# Patient Record
Sex: MALE | Race: WHITE | NOT HISPANIC OR LATINO | Employment: OTHER | URBAN - METROPOLITAN AREA
[De-identification: names, ages, dates, MRNs, and addresses within clinical notes are randomized per-mention and may not be internally consistent; named-entity substitution may affect disease eponyms.]

---

## 2021-02-08 PROBLEM — G47.33 OBSTRUCTIVE SLEEP APNEA: Status: ACTIVE | Noted: 2021-02-08

## 2021-02-09 ENCOUNTER — TRANSCRIBE ORDERS (OUTPATIENT)
Dept: SLEEP CENTER | Facility: CLINIC | Age: 61
End: 2021-02-09

## 2021-07-28 ENCOUNTER — OFFICE VISIT (OUTPATIENT)
Dept: URGENT CARE | Facility: MEDICAL CENTER | Age: 61
End: 2021-07-28
Payer: COMMERCIAL

## 2021-07-28 VITALS
DIASTOLIC BLOOD PRESSURE: 80 MMHG | RESPIRATION RATE: 16 BRPM | WEIGHT: 225 LBS | BODY MASS INDEX: 31.5 KG/M2 | TEMPERATURE: 99.6 F | OXYGEN SATURATION: 95 % | HEIGHT: 71 IN | HEART RATE: 90 BPM | SYSTOLIC BLOOD PRESSURE: 170 MMHG

## 2021-07-28 DIAGNOSIS — J40 BRONCHITIS: Primary | ICD-10-CM

## 2021-07-28 PROCEDURE — 99213 OFFICE O/P EST LOW 20 MIN: CPT | Performed by: PHYSICIAN ASSISTANT

## 2021-07-28 RX ORDER — BENZONATATE 100 MG/1
100 CAPSULE ORAL 3 TIMES DAILY PRN
Qty: 20 CAPSULE | Refills: 0 | Status: SHIPPED | OUTPATIENT
Start: 2021-07-28

## 2021-07-28 RX ORDER — ALBUTEROL SULFATE 90 UG/1
2 AEROSOL, METERED RESPIRATORY (INHALATION) EVERY 6 HOURS PRN
Qty: 8.5 G | Refills: 0 | Status: SHIPPED | OUTPATIENT
Start: 2021-07-28

## 2021-07-28 RX ORDER — ASPIRIN 81 MG/1
TABLET ORAL
COMMUNITY

## 2021-07-28 RX ORDER — FLUTICASONE PROPIONATE 50 MCG
SPRAY, SUSPENSION (ML) NASAL
COMMUNITY

## 2021-07-28 RX ORDER — AZITHROMYCIN 250 MG/1
TABLET, FILM COATED ORAL
Qty: 6 TABLET | Refills: 0 | Status: SHIPPED | OUTPATIENT
Start: 2021-07-28 | End: 2021-08-01

## 2021-07-28 RX ORDER — ATENOLOL 50 MG/1
TABLET ORAL
COMMUNITY

## 2021-07-28 NOTE — PATIENT INSTRUCTIONS
Acute Bronchitis   WHAT YOU NEED TO KNOW:   Acute bronchitis is swelling and irritation in your lungs  It is usually caused by a virus and most often happens in the winter  Bronchitis may also be caused by bacteria or by a chemical irritant, such as smoke  DISCHARGE INSTRUCTIONS:   Return to the emergency department if:   · You cough up blood  · Your lips or fingernails turn blue  · You feel like you are not getting enough air when you breathe  Call your doctor if:   · Your symptoms do not go away or get worse, even after treatment  · Your cough does not get better within 4 weeks  · You have questions or concerns about your condition or care  Medicines: You may  need any of the following:  · Cough suppressants  decrease your urge to cough  · Decongestants  help loosen mucus in your lungs and make it easier to cough up  This can help you breathe easier  · Inhalers  may be given  Your healthcare provider may give you one or more inhalers to help you breathe easier and cough less  An inhaler gives your medicine to open your airways  Ask your healthcare provider to show you how to use your inhaler correctly  · Acetaminophen  decreases pain and fever  It is available without a doctor's order  Ask how much to take and how often to take it  Follow directions  Read the labels of all other medicines you are using to see if they also contain acetaminophen, or ask your doctor or pharmacist  Acetaminophen can cause liver damage if not taken correctly  Do not use more than 4 grams (4,000 milligrams) total of acetaminophen in one day  · NSAIDs  help decrease swelling and pain or fever  This medicine is available with or without a doctor's order  NSAIDs can cause stomach bleeding or kidney problems in certain people  If you take blood thinner medicine, always ask your healthcare provider if NSAIDs are safe for you  Always read the medicine label and follow directions      · Take your medicine as directed  Contact your healthcare provider if you think your medicine is not helping or if you have side effects  Tell him of her if you are allergic to any medicine  Keep a list of the medicines, vitamins, and herbs you take  Include the amounts, and when and why you take them  Bring the list or the pill bottles to follow-up visits  Carry your medicine list with you in case of an emergency  Self-care:   · Drink liquids as directed  You may need to drink more liquids than usual to stay hydrated  Ask how much liquid to drink each day and which liquids are best for you  · Use a cool mist humidifier  to increase air moisture in your home  This may make it easier for you to breathe and help decrease your cough  · Get more rest   Rest helps your body to heal  Slowly start to do more each day  Rest when you feel it is needed  · Avoid irritants in the air  Avoid chemicals, fumes, and dust  Wear a face mask if you must work around dust or fumes  Stay inside on days when air pollution levels are high  If you have allergies, stay inside when pollen counts are high  Do not use aerosol products, such as spray-on deodorant, bug spray, and hair spray  · Do not smoke or be around others who are smoking  Nicotine and other chemicals in cigarettes and cigars can cause lung damage  Ask your healthcare provider for information if you currently smoke and need help to quit  E-cigarettes or smokeless tobacco still contain nicotine  Talk to your healthcare provider before you use these products  Prevent acute bronchitis:       · Get the vaccinations you need  Ask your healthcare provider if you should get the flu or pneumonia vaccines  · Prevent the spread of germs  You can decrease your risk for acute bronchitis and other illnesses by doing the following:     ? Wash your hands often with soap and water  Carry germ-killing hand lotion or gel with you   You can use the lotion or gel to clean your hands when soap and water are not available  ? Do not touch your eyes, nose, or mouth unless you have washed your hands first     ? Always cover your mouth when you cough to prevent the spread of germs  It is best to cough into a tissue or your shirt sleeve instead of into your hand  Ask those around you to cover their mouths when they cough  ? Try to avoid people who have a cold or the flu  If you are sick, stay away from others as much as possible  Follow up with your doctor as directed:  Write down questions you have so you will remember to ask them during your follow-up visits  © [email or web address] 2021 Information is for End User's use only and may not be sold, redistributed or otherwise used for commercial purposes  All illustrations and images included in CareNotes® are the copyrighted property of [employer]  or [employer]i Eisenberg  The above information is an  only  It is not intended as medical advice for individual conditions or treatments  Talk to your doctor, nurse or pharmacist before following any medical regimen to see if it is safe and effective for you

## 2021-07-28 NOTE — PROGRESS NOTES
[facility]        NAME: Camden Jimenez is a 57 y o  male  : 1960    MRN: [id number]  DATE: 2021  TIME: 2:55 PM    /80   Pulse 90   Temp 99 6 °F (37 6 °C)   Resp 16   Ht 5' 11" (1 803 m)   Wt 102 kg (225 lb)   SpO2 95%   BMI 31 38 kg/m²     Assessment and Plan   Bronchitis [J40]  1  Bronchitis  azithromycin (ZITHROMAX) 250 mg tablet    benzonatate (TESSALON PERLES) 100 mg capsule    albuterol (ProAir HFA) 90 mcg/act inhaler         Patient Instructions       Follow up with PCP in 3-5 days  Proceed to  ER if symptoms worsen  Chief Complaint     Chief Complaint   Patient presents with    Cough     Pt here for cough x 2 days  History of Present Illness       Pt with productive cough for about 1 week     Cough        Review of Systems   Review of Systems   Constitutional: Negative  HENT: Negative  Eyes: Negative  Respiratory: Positive for cough  Cardiovascular: Negative  Gastrointestinal: Negative  Endocrine: Negative  Genitourinary: Negative  Musculoskeletal: Negative  Skin: Negative  Allergic/Immunologic: Negative  Neurological: Negative  Hematological: Negative  Psychiatric/Behavioral: Negative  All other systems reviewed and are negative          Current Medications       Current Outpatient Medications:     albuterol (ProAir HFA) 90 mcg/act inhaler, Inhale 2 puffs every 6 (six) hours as needed for wheezing, Disp: 8 5 g, Rfl: 0    Ascorbic Acid (vitamin C) 100 MG tablet, Take 100 mg by mouth daily, Disp: , Rfl:     aspirin (ECOTRIN LOW STRENGTH) 81 mg EC tablet, , Disp: , Rfl:     aspirin 81 mg chewable tablet, CHEW ONE TABLET BY MOUTH EVERY DAY, Disp: , Rfl:     atenolol (TENORMIN) 50 mg tablet, Take 50 mg by mouth daily, Disp: , Rfl:     atenolol (TENORMIN) 50 mg tablet, , Disp: , Rfl:     azithromycin (ZITHROMAX) 250 mg tablet, Take 2 tablets today then 1 tablet daily x 4 days, Disp: 6 tablet, Rfl: 0   benzonatate (TESSALON PERLES) 100 mg capsule, Take 1 capsule (100 mg total) by mouth 3 (three) times a day as needed for cough, Disp: 20 capsule, Rfl: 0    Calcium Carbonate-Vit D-Min (CALCIUM 1200 PO), Take by mouth, Disp: , Rfl:     cholecalciferol (VITAMIN D3) 400 units tablet, Take 400 Units by mouth daily, Disp: , Rfl:     Coenzyme Q10 (CO Q 10 PO), Take by mouth, Disp: , Rfl:     cyanocobalamin (VITAMIN B-12) 100 MCG tablet, Take 100 mcg by mouth daily, Disp: , Rfl:     cyanocobalamin (VITAMIN B-12) 1000 MCG tablet, Take 2,000 mcg by mouth daily, Disp: , Rfl:     diphenhydrAMINE (BENADRYL) 25 mg tablet, Take 25 mg by mouth every 6 (six) hours as needed for itching, Disp: , Rfl:     FIBER COMPLETE PO, Take by mouth, Disp: , Rfl:     fluticasone (FLONASE) 50 mcg/act nasal spray, 2 sprays into each nostril, Disp: , Rfl:     fluticasone (Flonase) 50 mcg/act nasal spray, , Disp: , Rfl:     naproxen (NAPROSYN) 250 mg tablet, Take 250 mg by mouth 2 (two) times a day with meals, Disp: , Rfl:     oxyCODONE (ROXICODONE) 5 mg immediate release tablet, Take 1 tablet (5 mg total) by mouth every 4 (four) hours as needed for moderate pain or severe painMax Daily Amount: 30 mg, Disp: 10 tablet, Rfl: 0    pitavastatin (Livalo) 2 mg, , Disp: , Rfl:     Pitavastatin Calcium 1 MG TABS, Take 2 mg by mouth, Disp: , Rfl:     Current Allergies     Allergies as of 07/28/2021 - Reviewed 07/28/2021   Allergen Reaction Noted    Ibuprofen Wheezing 11/03/2016            The following portions of the patient's history were reviewed and updated as appropriate: allergies, current medications, past family history, past medical history, past social history, past surgical history and problem list      Past Medical History:   Diagnosis Date    Environmental allergies     Neurocardiogenic syncope        Past Surgical History:   Procedure Laterality Date    CARPAL TUNNEL RELEASE Bilateral 2015    CATARACT EXTRACTION Bilateral 2018 Family History   Problem Relation Age of Onset    Stroke Mother     Hypertension Mother     Stroke Father          Medications have been verified  Objective   /80   Pulse 90   Temp 99 6 °F (37 6 °C)   Resp 16   Ht 5' 11" (1 803 m)   Wt 102 kg (225 lb)   SpO2 95%   BMI 31 38 kg/m²        Physical Exam     Physical Exam  Vitals and nursing note reviewed  Constitutional:       Appearance: Normal appearance  He is normal weight  HENT:      Head: Normocephalic and atraumatic  Right Ear: Tympanic membrane, ear canal and external ear normal       Left Ear: Tympanic membrane, ear canal and external ear normal       Nose: Nose normal       Mouth/Throat:      Mouth: Mucous membranes are moist       Pharynx: Oropharynx is clear  Eyes:      Extraocular Movements: Extraocular movements intact  Conjunctiva/sclera: Conjunctivae normal       Pupils: Pupils are equal, round, and reactive to light  Cardiovascular:      Rate and Rhythm: Normal rate and regular rhythm  Pulses: Normal pulses  Heart sounds: Normal heart sounds  Pulmonary:      Effort: Pulmonary effort is normal       Comments: Minor coarse sounds  Abdominal:      General: Abdomen is flat  Bowel sounds are normal       Palpations: Abdomen is soft  Musculoskeletal:         General: Normal range of motion  Cervical back: Normal range of motion and neck supple  Skin:     General: Skin is warm  Capillary Refill: Capillary refill takes less than 2 seconds  Neurological:      General: No focal deficit present  Mental Status: He is alert and oriented to person, place, and time     Psychiatric:         Mood and Affect: Mood normal          Behavior: Behavior normal

## 2021-08-01 ENCOUNTER — APPOINTMENT (OUTPATIENT)
Dept: RADIOLOGY | Facility: MEDICAL CENTER | Age: 61
End: 2021-08-01
Payer: COMMERCIAL

## 2021-08-01 ENCOUNTER — OFFICE VISIT (OUTPATIENT)
Dept: URGENT CARE | Facility: MEDICAL CENTER | Age: 61
End: 2021-08-01
Payer: COMMERCIAL

## 2021-08-01 VITALS
TEMPERATURE: 97.8 F | WEIGHT: 225 LBS | RESPIRATION RATE: 22 BRPM | BODY MASS INDEX: 31.5 KG/M2 | HEART RATE: 78 BPM | OXYGEN SATURATION: 97 % | HEIGHT: 71 IN

## 2021-08-01 DIAGNOSIS — J40 BRONCHITIS: ICD-10-CM

## 2021-08-01 DIAGNOSIS — J01.90 ACUTE SINUSITIS, RECURRENCE NOT SPECIFIED, UNSPECIFIED LOCATION: ICD-10-CM

## 2021-08-01 DIAGNOSIS — J40 BRONCHITIS: Primary | ICD-10-CM

## 2021-08-01 PROCEDURE — 99213 OFFICE O/P EST LOW 20 MIN: CPT | Performed by: PHYSICIAN ASSISTANT

## 2021-08-01 PROCEDURE — 71046 X-RAY EXAM CHEST 2 VIEWS: CPT

## 2021-08-01 RX ORDER — AMOXICILLIN AND CLAVULANATE POTASSIUM 875; 125 MG/1; MG/1
1 TABLET, FILM COATED ORAL EVERY 12 HOURS SCHEDULED
Qty: 20 TABLET | Refills: 0 | Status: SHIPPED | OUTPATIENT
Start: 2021-08-01 | End: 2021-08-11

## 2021-08-01 RX ORDER — PREDNISONE 10 MG/1
TABLET ORAL
Qty: 30 TABLET | Refills: 0 | Status: SHIPPED | OUTPATIENT
Start: 2021-08-01

## 2021-08-01 NOTE — PROGRESS NOTES
[facility]        NAME: Rossana Marroquin is a 57 y o  male  : 1960    MRN: [id number]  DATE: 2021  TIME: 6:18 PM    Pulse 78   Temp 97 8 °F (36 6 °C)   Resp 22   Ht 5' 11" (1 803 m)   Wt 102 kg (225 lb)   SpO2 97%   BMI 31 38 kg/m²     Assessment and Plan   Bronchitis [J40]  1  Bronchitis  XR chest pa & lateral    amoxicillin-clavulanate (AUGMENTIN) 875-125 mg per tablet    predniSONE 10 mg tablet   2  Acute sinusitis, recurrence not specified, unspecified location  amoxicillin-clavulanate (AUGMENTIN) 875-125 mg per tablet    predniSONE 10 mg tablet         Patient Instructions       Follow up with PCP in 3-5 days  Proceed to  ER if symptoms worsen  Chief Complaint     Chief Complaint   Patient presents with    Cough     Patient was seen here on the  with cough  He was treated with zithromax, he states it initially helped but now he feels sick again, he is loosing his voice, and a hacking cough, he feels like his ches tis tight         History of Present Illness       Pt with continued nasal congestion and productive cough, pt had negative covid-19 test [facility]      Review of Systems   Review of Systems   Constitutional: Negative  HENT: Negative  Eyes: Negative  Respiratory: Negative  Cardiovascular: Negative  Gastrointestinal: Negative  Endocrine: Negative  Genitourinary: Negative  Musculoskeletal: Negative  Skin: Negative  Allergic/Immunologic: Negative  Neurological: Negative  Hematological: Negative  Psychiatric/Behavioral: Negative  All other systems reviewed and are negative          Current Medications       Current Outpatient Medications:     albuterol (ProAir HFA) 90 mcg/act inhaler, Inhale 2 puffs every 6 (six) hours as needed for wheezing, Disp: 8 5 g, Rfl: 0    Ascorbic Acid (vitamin C) 100 MG tablet, Take 100 mg by mouth daily, Disp: , Rfl:     aspirin (ECOTRIN LOW STRENGTH) 81 mg EC tablet, , Disp: , Rfl:     atenolol (TENORMIN) 50 mg tablet, Take 50 mg by mouth daily, Disp: , Rfl:     azithromycin (ZITHROMAX) 250 mg tablet, Take 2 tablets today then 1 tablet daily x 4 days, Disp: 6 tablet, Rfl: 0    benzonatate (TESSALON PERLES) 100 mg capsule, Take 1 capsule (100 mg total) by mouth 3 (three) times a day as needed for cough, Disp: 20 capsule, Rfl: 0    Calcium Carbonate-Vit D-Min (CALCIUM 1200 PO), Take by mouth, Disp: , Rfl:     cholecalciferol (VITAMIN D3) 400 units tablet, Take 400 Units by mouth daily, Disp: , Rfl:     Coenzyme Q10 (CO Q 10 PO), Take by mouth, Disp: , Rfl:     cyanocobalamin (VITAMIN B-12) 100 MCG tablet, Take 100 mcg by mouth daily, Disp: , Rfl:     diphenhydrAMINE (BENADRYL) 25 mg tablet, Take 25 mg by mouth every 6 (six) hours as needed for itching, Disp: , Rfl:     FIBER COMPLETE PO, Take by mouth, Disp: , Rfl:     fluticasone (FLONASE) 50 mcg/act nasal spray, 2 sprays into each nostril, Disp: , Rfl:     naproxen (NAPROSYN) 250 mg tablet, Take 250 mg by mouth 2 (two) times a day with meals, Disp: , Rfl:     oxyCODONE (ROXICODONE) 5 mg immediate release tablet, Take 1 tablet (5 mg total) by mouth every 4 (four) hours as needed for moderate pain or severe painMax Daily Amount: 30 mg, Disp: 10 tablet, Rfl: 0    pitavastatin (Livalo) 2 mg, , Disp: , Rfl:     amoxicillin-clavulanate (AUGMENTIN) 875-125 mg per tablet, Take 1 tablet by mouth every 12 (twelve) hours for 10 days, Disp: 20 tablet, Rfl: 0    aspirin 81 mg chewable tablet, CHEW ONE TABLET BY MOUTH EVERY DAY (Patient not taking: Reported on 8/1/2021), Disp: , Rfl:     atenolol (TENORMIN) 50 mg tablet, , Disp: , Rfl:     cyanocobalamin (VITAMIN B-12) 1000 MCG tablet, Take 2,000 mcg by mouth daily (Patient not taking: Reported on 8/1/2021), Disp: , Rfl:     fluticasone (Flonase) 50 mcg/act nasal spray, , Disp: , Rfl:     Pitavastatin Calcium 1 MG TABS, Take 2 mg by mouth (Patient not taking: Reported on 8/1/2021), Disp: , Rfl:     predniSONE 10 mg tablet, 5 tabs po qd x 2 days then 4 tabs po qd x 2 days then 3 tabs po qd x 2 days then 2 tabs po qd x 2 days then 1 tab po qd x 2 days, Disp: 30 tablet, Rfl: 0    Current Allergies     Allergies as of 08/01/2021 - Reviewed 08/01/2021   Allergen Reaction Noted    Ibuprofen Wheezing 11/03/2016            The following portions of the patient's history were reviewed and updated as appropriate: allergies, current medications, past family history, past medical history, past social history, past surgical history and problem list      Past Medical History:   Diagnosis Date    Environmental allergies     Neurocardiogenic syncope        Past Surgical History:   Procedure Laterality Date    CARPAL TUNNEL RELEASE Bilateral 2015    CATARACT EXTRACTION Bilateral 2018       Family History   Problem Relation Age of Onset    Stroke Mother     Hypertension Mother     Stroke Father          Medications have been verified  Objective   Pulse 78   Temp 97 8 °F (36 6 °C)   Resp 22   Ht 5' 11" (1 803 m)   Wt 102 kg (225 lb)   SpO2 97%   BMI 31 38 kg/m²        Physical Exam     Physical Exam  Vitals and nursing note reviewed  Constitutional:       Appearance: Normal appearance  He is normal weight  HENT:      Head: Normocephalic and atraumatic  Right Ear: Tympanic membrane, ear canal and external ear normal       Left Ear: Tympanic membrane, ear canal and external ear normal       Nose: Congestion present  Comments: Max sinus tenderness   +post nasal drip in pharynx yellow   Boggy nasal mucos      Mouth/Throat:      Comments: +post nasal drip yellow   Eyes:      Extraocular Movements: Extraocular movements intact  Conjunctiva/sclera: Conjunctivae normal       Pupils: Pupils are equal, round, and reactive to light  Cardiovascular:      Rate and Rhythm: Normal rate and regular rhythm  Pulses: Normal pulses  Heart sounds: Normal heart sounds     Pulmonary: Effort: Pulmonary effort is normal       Breath sounds: Normal breath sounds  Chest:      Chest wall: No mass  Abdominal:      General: Abdomen is flat  Bowel sounds are normal       Palpations: Abdomen is soft  Musculoskeletal:         General: Normal range of motion  Cervical back: Normal range of motion and neck supple  Skin:     General: Skin is warm  Capillary Refill: Capillary refill takes less than 2 seconds  Neurological:      General: No focal deficit present  Mental Status: He is alert and oriented to person, place, and time     Psychiatric:         Mood and Affect: Mood normal          Behavior: Behavior normal

## 2021-08-01 NOTE — PATIENT INSTRUCTIONS
Acute Bronchitis   WHAT YOU NEED TO KNOW:   Acute bronchitis is swelling and irritation in your lungs  It is usually caused by a virus and most often happens in the winter  Bronchitis may also be caused by bacteria or by a chemical irritant, such as smoke  DISCHARGE INSTRUCTIONS:   Return to the emergency department if:   · You cough up blood  · Your lips or fingernails turn blue  · You feel like you are not getting enough air when you breathe  Call your doctor if:   · Your symptoms do not go away or get worse, even after treatment  · Your cough does not get better within 4 weeks  · You have questions or concerns about your condition or care  Medicines: You may  need any of the following:  · Cough suppressants  decrease your urge to cough  · Decongestants  help loosen mucus in your lungs and make it easier to cough up  This can help you breathe easier  · Inhalers  may be given  Your healthcare provider may give you one or more inhalers to help you breathe easier and cough less  An inhaler gives your medicine to open your airways  Ask your healthcare provider to show you how to use your inhaler correctly  · Acetaminophen  decreases pain and fever  It is available without a doctor's order  Ask how much to take and how often to take it  Follow directions  Read the labels of all other medicines you are using to see if they also contain acetaminophen, or ask your doctor or pharmacist  Acetaminophen can cause liver damage if not taken correctly  Do not use more than 4 grams (4,000 milligrams) total of acetaminophen in one day  · NSAIDs  help decrease swelling and pain or fever  This medicine is available with or without a doctor's order  NSAIDs can cause stomach bleeding or kidney problems in certain people  If you take blood thinner medicine, always ask your healthcare provider if NSAIDs are safe for you  Always read the medicine label and follow directions      · Take your medicine as directed  Contact your healthcare provider if you think your medicine is not helping or if you have side effects  Tell him of her if you are allergic to any medicine  Keep a list of the medicines, vitamins, and herbs you take  Include the amounts, and when and why you take them  Bring the list or the pill bottles to follow-up visits  Carry your medicine list with you in case of an emergency  Self-care:   · Drink liquids as directed  You may need to drink more liquids than usual to stay hydrated  Ask how much liquid to drink each day and which liquids are best for you  · Use a cool mist humidifier  to increase air moisture in your home  This may make it easier for you to breathe and help decrease your cough  · Get more rest   Rest helps your body to heal  Slowly start to do more each day  Rest when you feel it is needed  · Avoid irritants in the air  Avoid chemicals, fumes, and dust  Wear a face mask if you must work around dust or fumes  Stay inside on days when air pollution levels are high  If you have allergies, stay inside when pollen counts are high  Do not use aerosol products, such as spray-on deodorant, bug spray, and hair spray  · Do not smoke or be around others who are smoking  Nicotine and other chemicals in cigarettes and cigars can cause lung damage  Ask your healthcare provider for information if you currently smoke and need help to quit  E-cigarettes or smokeless tobacco still contain nicotine  Talk to your healthcare provider before you use these products  Prevent acute bronchitis:       · Get the vaccinations you need  Ask your healthcare provider if you should get the flu or pneumonia vaccines  · Prevent the spread of germs  You can decrease your risk for acute bronchitis and other illnesses by doing the following:     ? Wash your hands often with soap and water  Carry germ-killing hand lotion or gel with you   You can use the lotion or gel to clean your hands when soap and water are not available  ? Do not touch your eyes, nose, or mouth unless you have washed your hands first     ? Always cover your mouth when you cough to prevent the spread of germs  It is best to cough into a tissue or your shirt sleeve instead of into your hand  Ask those around you to cover their mouths when they cough  ? Try to avoid people who have a cold or the flu  If you are sick, stay away from others as much as possible  Follow up with your doctor as directed:  Write down questions you have so you will remember to ask them during your follow-up visits  © Copyright [address] 2021 Information is for End User's use only and may not be sold, redistributed or otherwise used for commercial purposes  All illustrations and images included in CareNotes® are the copyrighted property of A D [employer] M [employer]  or Siri Null   The above information is an  only  It is not intended as medical advice for individual conditions or treatments  Talk to your doctor, nurse or pharmacist before following any medical regimen to see if it is safe and effective for you  Sinusitis   WHAT YOU NEED TO KNOW:   Sinusitis is inflammation or infection of your sinuses  Sinusitis is most often caused by a virus  Acute sinusitis may last up to 12 weeks  Chronic sinusitis lasts longer than 12 weeks  Recurrent sinusitis means you have 4 or more infections in 1 year  DISCHARGE INSTRUCTIONS:   Return to the emergency department if:   · You have trouble breathing or wheezing that is getting worse  · You have a stiff neck, a fever, or a bad headache  · You cannot open your eye  · Your eyeball bulges out or you cannot move your eye  · You are more sleepy than normal, or you notice changes in your ability to think, move, or talk  · You have swelling of your forehead or scalp  Call your doctor if:   · You have vision changes, such as double vision      · Your eye and eyelid are red, swollen, and painful  · Your symptoms do not improve or go away after 10 days  · You have nausea and are vomiting  · Your nose is bleeding  · You have questions or concerns about your condition or care  Medicines: Your symptoms may go away on their own  Your healthcare provider may recommend watchful waiting for up to 10 days before starting antibiotics  You may need any of the following:  · Acetaminophen  decreases pain and fever  It is available without a doctor's order  Ask how much to take and how often to take it  Follow directions  Read the labels of all other medicines you are using to see if they also contain acetaminophen, or ask your doctor or pharmacist  Acetaminophen can cause liver damage if not taken correctly  Do not use more than 4 grams (4,000 milligrams) total of acetaminophen in one day  · NSAIDs , such as ibuprofen, help decrease swelling, pain, and fever  This medicine is available with or without a doctor's order  NSAIDs can cause stomach bleeding or kidney problems in certain people  If you take blood thinner medicine, always ask your healthcare provider if NSAIDs are safe for you  Always read the medicine label and follow directions  · Nasal steroid sprays  may help decrease inflammation in your nose and sinuses  · Decongestants  help reduce swelling and drain mucus in the nose and sinuses  They may help you breathe easier  · Antihistamines  help dry mucus in the nose and relieve sneezing  · Antibiotics  help treat or prevent a bacterial infection  · Take your medicine as directed  Contact your healthcare provider if you think your medicine is not helping or if you have side effects  Tell him or her if you are allergic to any medicine  Keep a list of the medicines, vitamins, and herbs you take  Include the amounts, and when and why you take them  Bring the list or the pill bottles to follow-up visits   Carry your medicine list with you in case of an emergency  Self-care:   · Rinse your sinuses as directed  Use a sinus rinse device to rinse your nasal passages with a saline (salt water) solution or distilled water  Do not use tap water  This will help thin the mucus in your nose and rinse away pollen and dirt  It will also help reduce swelling so you can breathe normally  · Use a humidifier  to increase air moisture in your home  This may make it easier for you to breathe and help decrease your cough  · Sleep with your head elevated  Place an extra pillow under your head before you go to sleep to help your sinuses drain  · Drink liquids as directed  Ask your healthcare provider how much liquid to drink each day and which liquids are best for you  Liquids will thin the mucus in your nose and help it drain  Avoid drinks that contain alcohol or caffeine  · Do not smoke, and avoid secondhand smoke  Nicotine and other chemicals in cigarettes and cigars can make your symptoms worse  Ask your healthcare provider for information if you currently smoke and need help to quit  E-cigarettes or smokeless tobacco still contain nicotine  Talk to your healthcare provider before you use these products  Prevent the spread of germs:   · Wash your hands often with soap and water  Wash your hands after you use the bathroom, change a child's diaper, or sneeze  Wash your hands before you prepare or eat food  · Stay away from people who are sick  Some germs spread easily and quickly through contact  Follow up with your doctor as directed: You may be referred to an ear, nose, and throat specialist  Write down your questions so you remember to ask them during your visits  © Copyright [employer] 2021 Information is for End User's use only and may not be sold, redistributed or otherwise used for commercial purposes   All illustrations and images included in CareNotes® are the copyrighted property of [employer]  or Siri Eisenberg  The above information is an  only  It is not intended as medical advice for individual conditions or treatments  Talk to your doctor, nurse or pharmacist before following any medical regimen to see if it is safe and effective for you

## 2022-06-22 ENCOUNTER — EVALUATION (OUTPATIENT)
Dept: PHYSICAL THERAPY | Facility: CLINIC | Age: 62
End: 2022-06-22
Payer: COMMERCIAL

## 2022-06-22 DIAGNOSIS — M54.2 CERVICALGIA: ICD-10-CM

## 2022-06-22 DIAGNOSIS — M54.2 NECK PAIN, CHRONIC: Primary | ICD-10-CM

## 2022-06-22 DIAGNOSIS — G89.29 NECK PAIN, CHRONIC: Primary | ICD-10-CM

## 2022-06-22 DIAGNOSIS — M54.12 CERVICAL RADICULOPATHY: ICD-10-CM

## 2022-06-22 PROCEDURE — 97112 NEUROMUSCULAR REEDUCATION: CPT

## 2022-06-22 PROCEDURE — 97161 PT EVAL LOW COMPLEX 20 MIN: CPT

## 2022-06-22 NOTE — PROGRESS NOTES
PT Evaluation     Today's date: 2022  Patient name: Anupama Boyd  : 1960  MRN: [id number]  Referring provider: Yovani Lei  Dx:   Encounter Diagnosis     ICD-10-CM    1  Neck pain, chronic  M54 2     G89 29    2  Cervicalgia  M54 2    3  Cervical radiculopathy  M54 12        Start Time: 4766  Stop Time: 0825  Total time in clinic (min): 30 minutes    Assessment/Plan  Assessment: Pt is a 57year old male presenting with chronic neck pain and b/l UE symptoms  Pt denies any gait or balance concerns and dermatomal screen does not reveal any abnormalities, decreasing likelihood of any red flag diagnoses  Will continue to monitor for red flags throughout POC  Pt demonstrates decreased cervical mobility, pain with sidebending and R rotation  Pt reports pain with cervical retraction, symptoms increase in neck and UEs with repeated retraction  UE weakness evident upon shoulder flexion MMT  Relief with cervical distraction in seated  Pt will benefit from therapy to address cervical mobility and UE strength to decrease pain and increase participation in UE functional tasks  Pt will benefit from manual therapy to decrease pain and improve mobility  Will further assess thoracic mobility as well as median and nerve impingement and will address these areas as needed  Impairments: chronic neck pain, b/l UE numbness, impaired cervical mobility, decreased UE strength, median and ulnar nerve impairment    Pt understanding of Dx/POC: good  Prognosis: fair-good    Goals:   1  Pt will demonstrate symmetric and pain free cervical rotation AROM in 6 weeks in order to demonstrate improved cervical mobility  2  Pt will be independent in [facility] in order to maximize success with therapy and ease with discharge    3  Pt will achieve >4/5 strength upon MMT of shoulder flexion with no increase in pain in 6 weeks in order to demonstrate improved UE strength for functional reaching tasks    Plan:   Interventions: manual therapy, modalities, cervical mobility, postural strengthening, UE strengthening, HEP education  Frequency: 2x/wk  Duration 8 weeks    Subjective  HPI: Pt is a 57year old male presenting with chronic neck pain and associated b/l UE pain  Pt reports neck pain is in lower cervical region, stating pain is mostly between his shoulder blades  UE symptoms are in lower arm, below elbow and into hand  R sided symptoms more intense than L  Pt with b/l carpal and cubital tunnel releases about 9 years ago, which did not provide the relief he expected  Symptoms worse and night and better during the day  Old MRI results (5/23/2019) from [facility] scanned into chart, states "likely worsening spinal stenosis with residual median and ulnar nerve compression " Pt states no surgery planned at this time Pt started with gabapentin 300mg and lidocaine patches, which he thinks will help  Pt is retired  Pt reports hx of low back pain and associated LLE symptoms, but those are not of greatest concern at this time  Pt was seeing chiropractor for about 6 months, has continued with 4 exercise HEP that was prescribed to him  Pt denies any headaches or dizziness  Pt denies any balance impairments or recent falls  Pt with dx of neuro-syncope, which is managed with medication  Pt reports this dx increases risk of syncope, but he hasn't had any recent episodes and he typically knows when it is coming on and sits down       Pain:   Location: Neck and b/l UEs  Current: 4/10  At best: 2/10  At worst: 7/10  Aggravating factors: pain at night  Relieving factors: gabapentin and lidocaine patch, aleve PM    Pt Goals:   - decrease neck pain and numbness in UEs    Objective  BP: 140/86    Cervical ROM:   Flexion: chin to chest  Extension: limited, no UE symptoms  L Sidebending: limited *neck pain  R Sidebending: limited *neck pain  L Rotation: 50 degrees  R Rotation: 43 degrees *discomfort    Cervical Retraction: increases symptoms  Repeated Cervical Retraction: increases cervical discomfort and UE symptoms    Cervical Special Tests:   Spurlings:   - R: increased symptoms b/l  - L: no change     Compression: no change    ULTT screen: median nerve (+)    Distraction in Seated: (+)  improved neck and UE symptoms    UE Dermatomal Screen: WNL     MMT:   Flexion: 4/5 bl with some reported shoulder discomfort    HEP Print Out: [employer]; Access Code: [email or web address]    Flowsheet Rows    Flowsheet Row Most Recent Value   PT/OT G-Codes    Current Score 66   Projected Score 68             Precautions: Neurosyncope (managed with medication) but increased risk of syncope, motor symptoms and BP      Manuals 6/22            Cervical STM             Thoracic STM             PA Mobs C/S & T/S             Cervical Distraction             Neuro Re-Ed             HEP Education             Median Nerve ULTT             Ulnar Nerve ULTT             Median Nerve Glides             Ulnar Nerve Glides                                       Ther Ex             Cervical AROM HEP            Chin Tucks             Retraction with Ext             Cervical SNAGs             Cervical Rotation with Towel             Pec Stretch             Scap Retractions HEP            Prone I, Y, T             S/l open book             Supine with foam roll                                       Ther Activity             UBE                          Gait Training                                       Modalities             Heat (prn)

## 2022-06-22 NOTE — LETTER
2022    Destiny Mejia  [address] [place] [address] [address] [address]    Patient: Harshal Mcrae   YOB: 1960   Date of Visit: 2022     Encounter Diagnosis     ICD-10-CM    1  Neck pain, chronic  M54 2     G89 29    2  Cervicalgia  M54 2    3  Cervical radiculopathy  M54 12        Dear Dr Ramakrishna Campos: Thank you for your recent referral of Harshal Mcrae  Please review the attached evaluation summary from [facility]'s recent visit  Please verify that you agree with the plan of care by signing the attached order  If you have any questions or concerns, please do not hesitate to call  I sincerely appreciate the opportunity to share in the care of one of your patients and hope to have another opportunity to work with you in the near future  Sincerely,    Amanda Del Angel, PT      Referring Provider:      I certify that I have read the below Plan of Care and certify the need for these services furnished under this plan of treatment while under my care  Destiny Mejia  [facility] [address] [address]  Via Fax: [phone]          PT Evaluation     Today's date: 2022  Patient name: Harshal Mcrae  : 1960  MRN: [id number]  Referring provider: Sera Small  Dx:   Encounter Diagnosis     ICD-10-CM    1  Neck pain, chronic  M54 2     G89 29    2  Cervicalgia  M54 2    3  Cervical radiculopathy  M54 12        Start Time: 1768  Stop Time: 0825  Total time in clinic (min): 30 minutes    Assessment/Plan  Assessment: Pt is a 57year old male presenting with chronic neck pain and b/l UE symptoms  Pt denies any gait or balance concerns and dermatomal screen does not reveal any abnormalities, decreasing likelihood of any red flag diagnoses  Will continue to monitor for red flags throughout POC  Pt demonstrates decreased cervical mobility, pain with sidebending and R rotation   Pt reports pain with cervical retraction, symptoms increase in neck and UEs with repeated retraction  UE weakness evident upon shoulder flexion MMT  Relief with cervical distraction in seated  Pt will benefit from therapy to address cervical mobility and UE strength to decrease pain and increase participation in UE functional tasks  Pt will benefit from manual therapy to decrease pain and improve mobility  Will further assess thoracic mobility as well as median and nerve impingement and will address these areas as needed  Impairments: chronic neck pain, b/l UE numbness, impaired cervical mobility, decreased UE strength, median and ulnar nerve impairment    Pt understanding of Dx/POC: good  Prognosis: fair-good    Goals:   1  Pt will demonstrate symmetric and pain free cervical rotation AROM in 6 weeks in order to demonstrate improved cervical mobility  2  Pt will be independent in HEP in order to maximize success with therapy and ease with discharge  3  Pt will achieve >4/5 strength upon MMT of shoulder flexion with no increase in pain in 6 weeks in order to demonstrate improved UE strength for functional reaching tasks    Plan:   Interventions: manual therapy, modalities, cervical mobility, postural strengthening, UE strengthening, HEP education  Frequency: 2x/wk  Duration 8 weeks    Subjective  HPI: Pt is a 57year old male presenting with chronic neck pain and associated b/l UE pain  Pt reports neck pain is in lower cervical region, stating pain is mostly between his shoulder blades  UE symptoms are in lower arm, below elbow and into hand  R sided symptoms more intense than L  Pt with b/l carpal and cubital tunnel releases about 9 years ago, which did not provide the relief he expected  Symptoms worse and night and better during the day   Old MRI results (5/23/2019) from [place] [address] scanned into chart, states "likely worsening spinal stenosis with residual median and ulnar nerve compression " Pt states no surgery planned at this time Pt started with gabapentin 300mg and lidocaine patches, which he thinks will help  Pt is retired  Pt reports hx of low back pain and associated LLE symptoms, but those are not of greatest concern at this time  Pt was seeing chiropractor for about 6 months, has continued with 4 exercise HEP that was prescribed to him  Pt denies any headaches or dizziness  Pt denies any balance impairments or recent falls  Pt with dx of neuro-syncope, which is managed with medication  Pt reports this dx increases risk of syncope, but he hasn't had any recent episodes and he typically knows when it is coming on and sits down  Pain:   Location: Neck and b/l UEs  Current: 4/10  At best: 2/10  At worst: 7/10  Aggravating factors: pain at night  Relieving factors: gabapentin and lidocaine patch, aleve PM    Pt Goals:   - decrease neck pain and numbness in UEs    Objective  BP: 140/86    Cervical ROM:   Flexion: chin to chest  Extension: limited, no UE symptoms  L Sidebending: limited *neck pain  R Sidebending: limited *neck pain  L Rotation: 50 degrees  R Rotation: 43 degrees *discomfort    Cervical Retraction: increases symptoms  Repeated Cervical Retraction: increases cervical discomfort and UE symptoms    Cervical Special Tests:   Spurlings:   - R: increased symptoms b/l  - L: no change     Compression: no change    ULTT screen: median nerve (+)    Distraction in Seated: (+)  improved neck and UE symptoms    UE Dermatomal Screen: WNL     MMT:   Flexion: 4/5 bl with some reported shoulder discomfort    HEP Print Out: [employer]; Access Code: [email or web address]    Flowsheet Rows    Flowsheet Row Most Recent Value   PT/OT G-Codes    Current Score 66   Projected Score 68             Precautions: Neurosyncope (managed with medication) but increased risk of syncope, motor symptoms and BP      Manuals 6/22            Cervical STM             Thoracic STM             PA Mobs C/S & T/S             Cervical Distraction             Neuro Re-Ed             HEP Education             Median Nerve ULTT Ulnar Nerve ULTT             Median Nerve Glides             Ulnar Nerve Glides                                       Ther Ex             Cervical AROM HEP            Chin Tucks             Retraction with Ext             Cervical SNAGs             Cervical Rotation with Towel             Pec Stretch             Scap Retractions HEP            Prone I, Y, T             S/l open book             Supine with foam roll                                       Ther Activity             UBE                          Gait Training                                       Modalities             Heat (prn)

## 2022-06-28 ENCOUNTER — OFFICE VISIT (OUTPATIENT)
Dept: PHYSICAL THERAPY | Facility: CLINIC | Age: 62
End: 2022-06-28
Payer: COMMERCIAL

## 2022-06-28 DIAGNOSIS — G89.29 NECK PAIN, CHRONIC: Primary | ICD-10-CM

## 2022-06-28 DIAGNOSIS — M54.12 CERVICAL RADICULOPATHY: ICD-10-CM

## 2022-06-28 DIAGNOSIS — M54.2 NECK PAIN, CHRONIC: Primary | ICD-10-CM

## 2022-06-28 DIAGNOSIS — M54.2 CERVICALGIA: ICD-10-CM

## 2022-06-28 PROCEDURE — 97140 MANUAL THERAPY 1/> REGIONS: CPT

## 2022-06-28 PROCEDURE — 97110 THERAPEUTIC EXERCISES: CPT

## 2022-06-28 NOTE — PROGRESS NOTES
Daily Note     Today's date: 2022  Patient name: Alex Horton  : 1960  MRN: [id number]  Referring provider: Isaiah Blackmon  Dx:   Encounter Diagnosis     ICD-10-CM    1  Neck pain, chronic  M54 2     G89 29    2  Cervicalgia  M54 2    3  Cervical radiculopathy  M54 12        Start Time: 0845  Stop Time:   Total time in clinic (min): 40 minutes    Subjective: Pt reports he had a great night sleep after starting 600mg of gabapentin for one night, but the following night he didn't sleep great due to L hand numbness in thumb and first two fingers  Pt reports some stiffness today rating pain 2/10, but denies any UE symptoms  Adherence to HEP reported  Objective: See treatment diary below    Assessment: Tolerated treatment well  Stiffness of suboccipitals evident today and associated discomfort with STM in this region  Greater intensity reported with levator stretch compared to upper trap stretch  Pt reports his BP was slightly high at last [place] appointment, but denies any symptoms at this time associated with dx of neurosyncope  Will monitor BP at start of next visit  Encouraged to continue with HEP established at [facility] and with exercises chiropractor provided as long as they are not increasing pain  Patient would benefit from continued PT    Plan: Continue per plan of care  Progress treatment as tolerated         Precautions: Neurosyncope (managed with medication) but increased risk of syncope, motor symptoms and BP      Manuals            Cervical STM  HD (supine)           Thoracic STM             PA Mobs C/S & T/S             Cervical Distraction  HD           Neuro Re-Ed             HEP Education             Median Nerve ULTT             Ulnar Nerve ULTT             Median Nerve Glides             Ulnar Nerve Glides                                       Ther Ex             Cervical AROM HEP            Upper Trap Stretch  2x20" B           Levator Stretch  2x20" B           Chin Tucks 3" 2x10           Retraction with Ext             Cervical SNAGs             Cervical Rotation with Towel             Pec Stretch             Scap Retractions HEP            Prone I, Y, T             S/l open book  x10 B           Supine with foam roll             Supine Horz Abd  2x10 OTB           Supine Serratus Punch  2x10 5" 2#           No money  2x10 OTB           Ther Activity             UBE  2'/2'                        Gait Training                                       Modalities             Heat (prn)

## 2022-07-01 ENCOUNTER — OFFICE VISIT (OUTPATIENT)
Dept: PHYSICAL THERAPY | Facility: CLINIC | Age: 62
End: 2022-07-01
Payer: COMMERCIAL

## 2022-07-01 DIAGNOSIS — M54.2 CERVICALGIA: ICD-10-CM

## 2022-07-01 DIAGNOSIS — M54.2 NECK PAIN, CHRONIC: Primary | ICD-10-CM

## 2022-07-01 DIAGNOSIS — M54.12 CERVICAL RADICULOPATHY: ICD-10-CM

## 2022-07-01 DIAGNOSIS — G89.29 NECK PAIN, CHRONIC: Primary | ICD-10-CM

## 2022-07-01 PROCEDURE — 97112 NEUROMUSCULAR REEDUCATION: CPT

## 2022-07-01 PROCEDURE — 97140 MANUAL THERAPY 1/> REGIONS: CPT

## 2022-07-01 PROCEDURE — 97110 THERAPEUTIC EXERCISES: CPT

## 2022-07-01 NOTE — PROGRESS NOTES
Daily Note     Today's date: 2022  Patient name: Salina Quinones  : 1960  MRN: [id number]  Referring provider: Gloria Singh  Dx:   Encounter Diagnosis     ICD-10-CM    1  Neck pain, chronic  M54 2     G89 29    2  Cervicalgia  M54 2    3  Cervical radiculopathy  M54 12        Start Time: 0700  Stop Time: 0739  Total time in clinic (min): 39 minutes    Subjective: Pt states his neck is feeling ok, but his arms (elbow to hand b/l) feel a little sore today  Objective: See treatment diary below    Updated HEP Print Out: OnApp; Access Code: [email or web address]    Assessment: ULTT testing reveals bilateral medial and ulnar nerve involvement, supporting MRI findings from MRI a few years back  Nerve flossing initiated today  Tolerated treatment well  Pt reports relief following supine chin tucks, these were added to HEP as well  Patient demonstrated fatigue post treatment and would benefit from continued PT    Plan: Continue per plan of care  Progress treatment as tolerated         Precautions: Neurosyncope (managed with medication) but increased risk of syncope, motor symptoms and BP      Manuals           Cervical STM  HD (supine) HD (supine)          Thoracic STM             PA Mobs C/S & T/S             Cervical Distraction  HD HD          Neuro Re-Ed             HEP Education   HD          Median Nerve ULTT   HD (+ b/l )           Ulnar Nerve ULTT   HD (+ b/l )           Median Nerve Glides   x10 B HEP          Ulnar Nerve Glides   x10 B HEP                                    Ther Ex             Cervical AROM HEP            Upper Trap Stretch  2x20" B           Levator Stretch  2x20" B           Chin Tucks  3" 2x10 3" x15 (supine) HEP          Retraction with Ext             Cervical SNAGs             Cervical Rotation with Towel             Pec Stretch             Scap Retractions HEP            Prone I, Y, T             S/l open book  x10 B           Supine with foam roll Supine Horz Abd  2x10 OTB 4x5 OTB with chin tuck          Supine D2 Flexion   x10 B with chin tuck          Supine Serratus Punch  2x10 5" 2# 4x5 5" 2# with chin tuck          No money  2x10 OTB 2x10 GTB          Ther Activity             UBE  2'/2' 2'/2'                       Gait Training                                       Modalities             Heat (prn)

## 2022-07-06 ENCOUNTER — OFFICE VISIT (OUTPATIENT)
Dept: PHYSICAL THERAPY | Facility: CLINIC | Age: 62
End: 2022-07-06
Payer: COMMERCIAL

## 2022-07-06 DIAGNOSIS — G89.29 NECK PAIN, CHRONIC: Primary | ICD-10-CM

## 2022-07-06 DIAGNOSIS — M54.2 NECK PAIN, CHRONIC: Primary | ICD-10-CM

## 2022-07-06 DIAGNOSIS — M54.2 CERVICALGIA: ICD-10-CM

## 2022-07-06 DIAGNOSIS — M54.12 CERVICAL RADICULOPATHY: ICD-10-CM

## 2022-07-06 PROCEDURE — 97110 THERAPEUTIC EXERCISES: CPT

## 2022-07-06 PROCEDURE — 97140 MANUAL THERAPY 1/> REGIONS: CPT

## 2022-07-06 NOTE — PROGRESS NOTES
Daily Note     Today's date: 2022  Patient name: Yoko July  : 1960  MRN: [id number]  Referring provider: Promise Pizarro  Dx:   Encounter Diagnosis     ICD-10-CM    1  Neck pain, chronic  M54 2     G89 29    2  Cervicalgia  M54 2    3  Cervical radiculopathy  M54 12        Start Time: 07  Stop Time: 826  Total time in clinic (min): 41 minutes    Subjective: Pt states he is modifying medication schedule of gabapentin to 1 in the AM and 1 in the PM, based on guidance from [address] and pharmacist  Pt states he did not take his gabapentin this morning  Neck pain rated 2/10  UE symptoms reported as not intense today  Objective: See treatment diary below    BP: 130/84    Assessment: Discomfort and stiffness evident upon segmental mobility assessment of cervical spine, around C3-C6 region  Improved mobility and decreased discomfort reported following grade 2 mobs in this region, indicating pt will benefit from continued manual therapy in this region to improve cervical mobility and decrease any nerve irritation in this region  Tolerated treatment well  Patient demonstrated fatigue post treatment and would benefit from continued PT    Plan: Continue per plan of care  Progress treatment as tolerated         Precautions: Neurosyncope (managed with medication) but increased risk of syncope, motor symptoms and BP      Manuals          Cervical STM  HD (supine) HD (supine) HD (supine)         Thoracic STM             PA Mobs C/S & T/S    HD C/S (prone)         Cervical Distraction  HD HD          Neuro Re-Ed             HEP Education   HD          Median Nerve ULTT   HD (+ b/l )           Ulnar Nerve ULTT   HD (+ b/l )           Median Nerve Glides   x10 B HEP x10 B         Ulnar Nerve Glides   x10 B HEP x10 B                                   Ther Ex             Cervical AROM HEP            Upper Trap Stretch  2x20" B  30"x2 B         Levator Stretch  2x20" B           Chin Tucks  3" 2x10 3" x15 (supine) HEP 3" 2x10 (supine)         Retraction with Ext             Cervical SNAGs             Cervical Rotation with Towel             Pec Stretch             Scap Retractions HEP            Prone I, Y, T             S/l open book  x10 B           Supine with foam roll             Supine Horz Abd  2x10 OTB 4x5 OTB with chin tuck 4x5 OTB with chin tuck         Supine D2 Flexion   x10 B with chin tuck 2x5 B with chin tuck         Supine Serratus Punch  2x10 5" 2# 4x5 5" 2# with chin tuck 4x5 5" 2# with chin tuck         No money  2x10 OTB 2x10 GTB 2x10 GTB         Ther Activity             UBE  2'/2' 2'/2' 2'/2'                      Gait Training                                       Modalities             Heat (prn)

## 2022-07-08 ENCOUNTER — OFFICE VISIT (OUTPATIENT)
Dept: PHYSICAL THERAPY | Facility: CLINIC | Age: 62
End: 2022-07-08
Payer: COMMERCIAL

## 2022-07-08 DIAGNOSIS — G89.29 NECK PAIN, CHRONIC: Primary | ICD-10-CM

## 2022-07-08 DIAGNOSIS — M54.12 CERVICAL RADICULOPATHY: ICD-10-CM

## 2022-07-08 DIAGNOSIS — M54.2 CERVICALGIA: ICD-10-CM

## 2022-07-08 DIAGNOSIS — M54.2 NECK PAIN, CHRONIC: Primary | ICD-10-CM

## 2022-07-08 PROCEDURE — 97140 MANUAL THERAPY 1/> REGIONS: CPT

## 2022-07-08 PROCEDURE — 97110 THERAPEUTIC EXERCISES: CPT

## 2022-07-08 NOTE — PROGRESS NOTES
Daily Note     Today's date: 2022  Patient name: Keyshawn De La Paz  : 1960  MRN: [id number]  Referring provider: Daniel Diaz  Dx:   Encounter Diagnosis     ICD-10-CM    1  Neck pain, chronic  M54 2     G89 29    2  Cervicalgia  M54 2    3  Cervical radiculopathy  M54 12        Start Time: 1001  Stop Time: 1040  Total time in clinic (min): 39 minutes    Subjective: Pt rates pain as 2/10 today  Pt reports episode yesterday of a stabbing pain in thoracic spine when he twisted the wrong way, resolved with time  Objective: See treatment diary below    Assessment: Tolerated treatment well  Tingling in head with prone scap retractions, likely associated with prolonged time in prone after manual therapy  Symptoms resolved following seated cervical distraction, and did not return when returned to neutral head position  Will continue to monitor  Patient demonstrated fatigue post treatment and would benefit from continued PT    Plan: Continue per plan of care  Progress treatment as tolerated         Precautions: Neurosyncope (managed with medication) but increased risk of syncope, motor symptoms and BP      Manuals         Cervical STM  HD (supine) HD (supine) HD (supine) HD (supine)        Thoracic STM             PA Mobs C/S & T/S    HD C/S (prone) HD (prone)        Cervical Distraction  HD HD  1 rep in seated        Neuro Re-Ed             HEP Education   HD          Median Nerve ULTT   HD (+ b/l )           Ulnar Nerve ULTT   HD (+ b/l )           Median Nerve Glides   x10 B HEP x10 B         Ulnar Nerve Glides   x10 B HEP x10 B                                   Ther Ex             Cervical AROM HEP            Upper Trap Stretch  2x20" B  30"x2 B 20" x3        Levator Stretch  2x20" B   20" x3        Chin Tucks  3" 2x10 3" x15 (supine) HEP 3" 2x10 (supine) 3" 2x10        Retraction with Ext             Cervical SNAGs             Cervical Rotation with Towel             Pec Stretch Scap Retractions HEP    Prone x8        Prone I, Y, T             S/l open book  x10 B           Supine with foam roll             Supine Horz Abd  2x10 OTB 4x5 OTB with chin tuck 4x5 OTB with chin tuck         Supine D2 Flexion   x10 B with chin tuck 2x5 B with chin tuck         Supine Serratus Punch  2x10 5" 2# 4x5 5" 2# with chin tuck 4x5 5" 2# with chin tuck         No money  2x10 OTB 2x10 GTB 2x10 GTB         TB Rows/Ext     2x10 3" GTB        Ther Activity             UBE  2'/2' 2'/2' 2'/2' 3'/3'                     Gait Training                                       Modalities             Heat (prn)

## 2022-07-18 ENCOUNTER — APPOINTMENT (OUTPATIENT)
Dept: PHYSICAL THERAPY | Facility: CLINIC | Age: 62
End: 2022-07-18
Payer: COMMERCIAL

## 2022-07-20 ENCOUNTER — APPOINTMENT (OUTPATIENT)
Dept: PHYSICAL THERAPY | Facility: CLINIC | Age: 62
End: 2022-07-20
Payer: COMMERCIAL

## 2022-07-22 ENCOUNTER — APPOINTMENT (OUTPATIENT)
Dept: PHYSICAL THERAPY | Facility: CLINIC | Age: 62
End: 2022-07-22
Payer: COMMERCIAL

## 2022-07-25 ENCOUNTER — APPOINTMENT (OUTPATIENT)
Dept: PHYSICAL THERAPY | Facility: CLINIC | Age: 62
End: 2022-07-25
Payer: COMMERCIAL

## 2022-07-27 ENCOUNTER — OFFICE VISIT (OUTPATIENT)
Dept: PHYSICAL THERAPY | Facility: CLINIC | Age: 62
End: 2022-07-27
Payer: COMMERCIAL

## 2022-07-27 DIAGNOSIS — M54.2 NECK PAIN, CHRONIC: Primary | ICD-10-CM

## 2022-07-27 DIAGNOSIS — M54.12 CERVICAL RADICULOPATHY: ICD-10-CM

## 2022-07-27 DIAGNOSIS — G89.29 NECK PAIN, CHRONIC: Primary | ICD-10-CM

## 2022-07-27 DIAGNOSIS — M54.2 CERVICALGIA: ICD-10-CM

## 2022-07-27 PROCEDURE — 97140 MANUAL THERAPY 1/> REGIONS: CPT

## 2022-07-27 PROCEDURE — 97112 NEUROMUSCULAR REEDUCATION: CPT

## 2022-07-27 PROCEDURE — 97110 THERAPEUTIC EXERCISES: CPT

## 2022-07-27 NOTE — PROGRESS NOTES
Daily Note     Today's date: 2022  Patient name: Polly Wesley  : 1960  MRN: [id number]  Referring provider: Elizabeth Kiser  Dx:   Encounter Diagnosis     ICD-10-CM    1  Neck pain, chronic  M54 2     G89 29    2  Cervicalgia  M54 2    3  Cervical radiculopathy  M54 12        Start Time: 0830  Stop Time: 09  Total time in clinic (min): 38 minutes    Subjective: Pt reports he hasn't been doing his exercises since he was sick, and feeling fatigued  Fatigue has improved and pt feels back to himself post-covid  Pt states that he is no longer having thoracic pain in scapular region, but he was experiencing some L sided muscle spasms last night  Pain today reported in cervical region, centrally  Objective: See treatment diary below    Assessment: Low intensity today since patient was away and then sick and was not performing exercises at home  Tolerated treatment well  Pt states he feels better at end of session  Patient demonstrated fatigue post treatment and would benefit from continued PT    Plan: Continue per plan of care  Progress treatment as tolerated         Precautions: Neurosyncope (managed with medication) but increased risk of syncope, motor symptoms and BP      Manuals        Cervical STM  HD (supine) HD (supine) HD (supine) HD (supine) HD (supine)       Thoracic STM             PA Mobs C/S & T/S    HD C/S (prone) HD (prone)        Cervical Distraction  HD HD  1 rep in seated        Neuro Re-Ed             HEP Education   HD          Median Nerve ULTT   HD (+ b/l )           Ulnar Nerve ULTT   HD (+ b/l )           Median Nerve Glides   x10 B HEP x10 B  2x10 B       Ulnar Nerve Glides   x10 B HEP x10 B  2x10 B                                 Ther Ex             Cervical AROM HEP            Upper Trap Stretch  2x20" B  30"x2 B 20" x3 30" x2 B       Levator Stretch  2x20" B   20" x3        Chin Tucks  3" 2x10 3" x15 (supine) HEP 3" 2x10 (supine) 3" 2x10 3" 2x10 (supine)       Retraction with Ext             Cervical SNAGs             Cervical Rotation with Towel             Pec Stretch             Scap Retractions HEP    Prone x8        Prone I, Y, T             S/l open book  x10 B           Supine with foam roll             Supine Horz Abd  2x10 OTB 4x5 OTB with chin tuck 4x5 OTB with chin tuck  2x10 OTB       Supine D2 Flexion   x10 B with chin tuck 2x5 B with chin tuck  2x5 B with chin tuck       Supine Serratus Punch  2x10 5" 2# 4x5 5" 2# with chin tuck 4x5 5" 2# with chin tuck  2x10 5" 2#       No money  2x10 OTB 2x10 GTB 2x10 GTB         TB Rows/Ext     2x10 3" GTB        Ther Activity             UBE  2'/2' 2'/2' 2'/2' 3'/3' 3'/3'                    Gait Training                                       Modalities             Heat (prn)

## 2022-07-29 ENCOUNTER — APPOINTMENT (OUTPATIENT)
Dept: PHYSICAL THERAPY | Facility: CLINIC | Age: 62
End: 2022-07-29
Payer: COMMERCIAL

## 2022-07-29 ENCOUNTER — OFFICE VISIT (OUTPATIENT)
Dept: PHYSICAL THERAPY | Facility: CLINIC | Age: 62
End: 2022-07-29
Payer: COMMERCIAL

## 2022-07-29 DIAGNOSIS — G89.29 NECK PAIN, CHRONIC: Primary | ICD-10-CM

## 2022-07-29 DIAGNOSIS — M54.2 CERVICALGIA: ICD-10-CM

## 2022-07-29 DIAGNOSIS — M54.12 CERVICAL RADICULOPATHY: ICD-10-CM

## 2022-07-29 DIAGNOSIS — M54.2 NECK PAIN, CHRONIC: Primary | ICD-10-CM

## 2022-07-29 PROCEDURE — 97110 THERAPEUTIC EXERCISES: CPT

## 2022-07-29 PROCEDURE — 97140 MANUAL THERAPY 1/> REGIONS: CPT

## 2022-07-29 NOTE — PROGRESS NOTES
Daily Note     Today's date: 2022  Patient name: Maria Luz Trujillo  : 1960  MRN: [id number]  Referring provider: Paulina Molina  Dx:   Encounter Diagnosis     ICD-10-CM    1  Neck pain, chronic  M54 2     G89 29    2  Cervicalgia  M54 2    3  Cervical radiculopathy  M54 12        Start Time: 0700  Stop Time: 2690  Total time in clinic (min): 44 minutes    Subjective: Pt reports some pain in lower cervical region  Objective: See treatment diary below    Assessment: Thoracic tightness evident upon PA mobs  Progressed TE today  Tolerated treatment well  Requires cues to decrease shoulder shrug with TB extension  Pt reports decrease in pain at end of session, but pain not completley abolished  Patient demonstrated fatigue post treatment and would benefit from continued PT    Plan: Continue per plan of care  Progress treatment as tolerated         Precautions: Neurosyncope (managed with medication) but increased risk of syncope, motor symptoms and BP      Manuals       Cervical STM  HD (supine) HD (supine) HD (supine) HD (supine) HD (supine)       Thoracic STM       HD (prone)      PA Mobs C/S & T/S    HD C/S (prone) HD (prone)  HD (prone)      Cervical Distraction  HD HD  1 rep in seated        Neuro Re-Ed             HEP Education   HD          Median Nerve ULTT   HD (+ b/l )           Ulnar Nerve ULTT   HD (+ b/l )           Median Nerve Glides   x10 B HEP x10 B  2x10 B       Ulnar Nerve Glides   x10 B HEP x10 B  2x10 B                                 Ther Ex             Cervical AROM HEP            Upper Trap Stretch  2x20" B  30"x2 B 20" x3 30" x2 B       Levator Stretch  2x20" B   20" x3        Chin Tucks  3" 2x10 3" x15 (supine) HEP 3" 2x10 (supine) 3" 2x10 3" 2x10 (supine) 3" 2x10 (supine)      Retraction with Ext             Cervical SNAGs             Cervical Rotation with Towel             Pec Stretch             Scap Retractions HEP    Prone x8  Prone 3" 2x10 Prone I, Y, T       T 3" 2x10      S/l open book  x10 B     x10 B       Supine with foam roll             Supine Horz Abd  2x10 OTB 4x5 OTB with chin tuck 4x5 OTB with chin tuck  2x10 OTB 2x10 OTB      Supine D2 Flexion   x10 B with chin tuck 2x5 B with chin tuck  2x5 B with chin tuck 2x5 B with chin tuck      Supine Serratus Punch  2x10 5" 2# 4x5 5" 2# with chin tuck 4x5 5" 2# with chin tuck  2x10 5" 2# 2x10 5" 2#      No money  2x10 OTB 2x10 GTB 2x10 GTB         TB Rows/Ext     2x10 3" GTB  2x10 3" GTB      Ther Activity             UBE  2'/2' 2'/2' 2'/2' 3'/3' 3'/3' 3'/3' lvl 1 5                   Gait Training                                       Modalities             Heat (prn)

## 2022-08-01 ENCOUNTER — OFFICE VISIT (OUTPATIENT)
Dept: PHYSICAL THERAPY | Facility: CLINIC | Age: 62
End: 2022-08-01
Payer: COMMERCIAL

## 2022-08-01 DIAGNOSIS — G89.29 NECK PAIN, CHRONIC: Primary | ICD-10-CM

## 2022-08-01 DIAGNOSIS — M54.12 CERVICAL RADICULOPATHY: ICD-10-CM

## 2022-08-01 DIAGNOSIS — M54.2 NECK PAIN, CHRONIC: Primary | ICD-10-CM

## 2022-08-01 DIAGNOSIS — M54.2 CERVICALGIA: ICD-10-CM

## 2022-08-01 PROCEDURE — 97110 THERAPEUTIC EXERCISES: CPT

## 2022-08-01 PROCEDURE — 97140 MANUAL THERAPY 1/> REGIONS: CPT

## 2022-08-01 NOTE — PROGRESS NOTES
Daily Note     Today's date: 2022  Patient name: Nic Gasca  : 1960  MRN: [id number]  Referring provider: Juan Antonio Hennessy  Dx:   Encounter Diagnosis     ICD-10-CM    1  Neck pain, chronic  M54 2     G89 29    2  Cervicalgia  M54 2    3  Cervical radiculopathy  M54 12        Start Time: 1405  Stop Time: 1017  Total time in clinic (min): 49 minutes    Subjective: Pt reports he is doing okay today  Reports of feeling nerve tension with the nerve glides, otherwise tolerated it fine  Pt noted feeling better post treatment/MT  Has no symptoms down his RUE  Has a little bit of pain in his neck this morning  Objective: See treatment diary below      Assessment: Tolerated treatment well  Patient exhibited good technique with therapeutic exercises and would benefit from continued PT      Plan: Continue per plan of care        Precautions: Neurosyncope (managed with medication) but increased risk of syncope, motor symptoms and BP      Manuals      Cervical STM  HD (supine) HD (supine) HD (supine) HD (supine) HD (supine)       Thoracic STM       HD (prone) NA (prone)     PA Mobs C/S & T/S    HD C/S (prone) HD (prone)  HD (prone) NA (prone)     Cervical Distraction  HD HD  1 rep in seated        Neuro Re-Ed             HEP Education   HD          Median Nerve ULTT   HD (+ b/l )           Ulnar Nerve ULTT   HD (+ b/l )           Median Nerve Glides   x10 B HEP x10 B  2x10 B  2x10 B     Ulnar Nerve Glides   x10 B HEP x10 B  2x10 B  2x10 B                               Ther Ex             Cervical AROM HEP            Upper Trap Stretch  2x20" B  30"x2 B 20" x3 30" x2 B  30"x2 B     Levator Stretch  2x20" B   20" x3        Chin Tucks  3" 2x10 3" x15 (supine) HEP 3" 2x10 (supine) 3" 2x10 3" 2x10 (supine) 3" 2x10 (supine) 3" 2x10 (supine)     Retraction with Ext             Cervical SNAGs             Cervical Rotation with Towel             Pec Stretch             Scap Retractions HEP    Prone x8  Prone 3" 2x10 Prone 3"2x10     Prone I, Y, T       T 3" 2x10 T 3" 2x10     S/l open book  x10 B     x10 B  x10 B     Supine with foam roll             Supine Horz Abd  2x10 OTB 4x5 OTB with chin tuck 4x5 OTB with chin tuck  2x10 OTB 2x10 OTB 2x10 3"   OTB     Supine D2 Flexion   x10 B with chin tuck 2x5 B with chin tuck  2x5 B with chin tuck 2x5 B with chin tuck 2x5 B with chin tuck     Supine Serratus Punch  2x10 5" 2# 4x5 5" 2# with chin tuck 4x5 5" 2# with chin tuck  2x10 5" 2# 2x10 5" 2# 2x10 5" 3"     No money  2x10 OTB 2x10 GTB 2x10 GTB    2x10 3" GTB     TB Rows/Ext     2x10 3" GTB  2x10 3" GTB 2x10 3"   GTB     Ther Activity             UBE  2'/2' 2'/2' 2'/2' 3'/3' 3'/3' 3'/3' lvl 1 5 3'/3' lvl 1 5                   Gait Training                                       Modalities             Heat (prn)

## 2022-08-03 ENCOUNTER — OFFICE VISIT (OUTPATIENT)
Dept: PHYSICAL THERAPY | Facility: CLINIC | Age: 62
End: 2022-08-03
Payer: COMMERCIAL

## 2022-08-03 DIAGNOSIS — G89.29 NECK PAIN, CHRONIC: Primary | ICD-10-CM

## 2022-08-03 DIAGNOSIS — M54.2 CERVICALGIA: ICD-10-CM

## 2022-08-03 DIAGNOSIS — M54.2 NECK PAIN, CHRONIC: Primary | ICD-10-CM

## 2022-08-03 PROCEDURE — 97110 THERAPEUTIC EXERCISES: CPT

## 2022-08-03 NOTE — PROGRESS NOTES
Daily Note     Today's date: 8/3/2022  Patient name: Argelia Sanchez  : 1960  MRN: [id number]  Referring provider: Kamran Rice  Dx:   Encounter Diagnosis     ICD-10-CM    1  Neck pain, chronic  M54 2     G89 29    2  Cervicalgia  M54 2        Start Time: 46  Stop Time: 0840  Total time in clinic (min): 55 minutes    Subjective: Patient reports that he is doing good today  He states he no longer has the tension with nerve glides  He states he only has minor pain in his neck prior to start of session  He denies symptoms down RUE  Objective: See treatment diary below  Held nerve glides 2* to patient not demonstrating radicular symptoms  Resume NV  Assessment: Patient demonstrates gains in scap and c/s strength  Occasional VCs utilized for proper midtrap activation  Good response to manuals with t/s tone reduced post  Patient demonstrated appropriate fatigue post  Patient would benefit from continued PT  Plan: Continue per plan of care        Precautions: Neurosyncope (managed with medication) but increased risk of syncope, motor symptoms and BP      Manuals 6/22 6/28 7/1 7/6 7/8 7/27 7/29 8/1 8/3    Cervical STM  HD (supine) HD (supine) HD (supine) HD (supine) HD (supine)       Thoracic STM       HD (prone) NA (prone) LQ (Prone)    PA Mobs C/S & T/S    HD C/S (prone) HD (prone)  HD (prone) NA (prone) MK (Prone)    Cervical Distraction  HD HD  1 rep in seated        Neuro Re-Ed             HEP Education   HD          Median Nerve ULTT   HD (+ b/l )           Ulnar Nerve ULTT   HD (+ b/l )           Median Nerve Glides   x10 B HEP x10 B  2x10 B  2x10 B NV    Ulnar Nerve Glides   x10 B HEP x10 B  2x10 B  2x10 B NV                              Ther Ex             Cervical AROM HEP            Upper Trap Stretch  2x20" B  30"x2 B 20" x3 30" x2 B  30"x2 B 30"x2 B    Levator Stretch  2x20" B   20" x3        Chin Tucks  3" 2x10 3" x15 (supine) HEP 3" 2x10 (supine) 3" 2x10 3" 2x10 (supine) 3" 2x10 (supine) 3" 2x10 (supine) 3" 2x10 (supine)    Retraction with Ext             Cervical SNAGs             Cervical Rotation with Towel             Pec Stretch             Scap Retractions HEP    Prone x8  Prone 3" 2x10 Prone 3"2x10 Prone 3"2x10    Prone I, Y, T       T 3" 2x10 T 3" 2x10 T 3" 2x10    S/l open book  x10 B     x10 B  x10 B 2x10, 5" B    Supine with foam roll             Supine Horz Abd  2x10 OTB 4x5 OTB with chin tuck 4x5 OTB with chin tuck  2x10 OTB 2x10 OTB 2x10 3"   OTB 2x10 3"   OTB    Supine D2 Flexion   x10 B with chin tuck 2x5 B with chin tuck  2x5 B with chin tuck 2x5 B with chin tuck 2x5 B with chin tuck 2x5 B with chin tuck    Supine Serratus Punch  2x10 5" 2# 4x5 5" 2# with chin tuck 4x5 5" 2# with chin tuck  2x10 5" 2# 2x10 5" 2# 2x10 5" 3" 2x10 5" 3#    No money  2x10 OTB 2x10 GTB 2x10 GTB    2x10 3" GTB 2x10 3" GTB    TB Rows/Ext     2x10 3" GTB  2x10 3" GTB 2x10 3"   GTB 2x10 3"   GTB    Ther Activity             UBE  2'/2' 2'/2' 2'/2' 3'/3' 3'/3' 3'/3' lvl 1 5 3'/3' lvl 1 5  3'/3' lvl 1 8                  Gait Training                                       Modalities             Heat (prn)

## 2022-08-08 ENCOUNTER — OFFICE VISIT (OUTPATIENT)
Dept: PHYSICAL THERAPY | Facility: CLINIC | Age: 62
End: 2022-08-08
Payer: COMMERCIAL

## 2022-08-08 DIAGNOSIS — M54.12 CERVICAL RADICULOPATHY: ICD-10-CM

## 2022-08-08 DIAGNOSIS — M54.2 CERVICALGIA: ICD-10-CM

## 2022-08-08 DIAGNOSIS — M54.2 NECK PAIN, CHRONIC: Primary | ICD-10-CM

## 2022-08-08 DIAGNOSIS — G89.29 NECK PAIN, CHRONIC: Primary | ICD-10-CM

## 2022-08-08 PROCEDURE — 97110 THERAPEUTIC EXERCISES: CPT

## 2022-08-08 PROCEDURE — 97140 MANUAL THERAPY 1/> REGIONS: CPT

## 2022-08-08 NOTE — PROGRESS NOTES
Daily Note     Today's date: 2022  Patient name: Alok Rush  : 1960  MRN: [id number]  Referring provider: Eric Lewis  Dx:   Encounter Diagnosis     ICD-10-CM    1  Neck pain, chronic  M54 2     G89 29    2  Cervicalgia  M54 2    3  Cervical radiculopathy  M54 12        Start Time: 0932  Stop Time: 1012  Total time in clinic (min): 40 minutes    Subjective: Pt reports some numbness today, stating overall he feels like he is progressing  Objective: See treatment diary below    Assessment: Greater tightness evident on L side upon prone cervical and thoracic STM  Heron for rows today  Tolerated treatment well  Patient demonstrated fatigue post treatment and would benefit from continued PT    Plan: Continue per plan of care  Progress treatment as tolerated         Precautions: Neurosyncope (managed with medication) but increased risk of syncope, motor symptoms and BP      Manuals 6/22 6/28 7/1 7/6 7/8 7/27 7/29 8/1 8/3 8/8   Cervical STM  HD (supine) HD (supine) HD (supine) HD (supine) HD (supine)       Thoracic STM       HD (prone) NA (prone) LQ (Prone) HD (prone)   PA Mobs C/S & T/S    HD C/S (prone) HD (prone)  HD (prone) NA (prone) MK (Prone) HD (prone)   Cervical Distraction  HD HD  1 rep in seated        Neuro Re-Ed             HEP Education   HD          Median Nerve ULTT   HD (+ b/l )           Ulnar Nerve ULTT   HD (+ b/l )           Median Nerve Glides   x10 B HEP x10 B  2x10 B  2x10 B NV    Ulnar Nerve Glides   x10 B HEP x10 B  2x10 B  2x10 B NV                              Ther Ex             Cervical AROM HEP            Upper Trap Stretch  2x20" B  30"x2 B 20" x3 30" x2 B  30"x2 B 30"x2 B    Levator Stretch  2x20" B   20" x3        Chin Tucks  3" 2x10 3" x15 (supine) HEP 3" 2x10 (supine) 3" 2x10 3" 2x10 (supine) 3" 2x10 (supine) 3" 2x10 (supine) 3" 2x10 (supine) 5" 2x10 (supine)   Retraction with Ext             Cervical SNAGs             Cervical Rotation with Towel Pec Stretch             Scap Retractions HEP    Prone x8  Prone 3" 2x10 Prone 3"2x10 Prone 3"2x10 Prone 3"2x10   Prone I, Y, T       T 3" 2x10 T 3" 2x10 T 3" 2x10 T 3" 2x10   S/l open book  x10 B     x10 B  x10 B 2x10, 5" B 2x10 5" B   Supine with foam roll             Supine Horz Abd  2x10 OTB 4x5 OTB with chin tuck 4x5 OTB with chin tuck  2x10 OTB 2x10 OTB 2x10 3"   OTB 2x10 3"   OTB 2x10 OTB   Supine D2 Flexion   x10 B with chin tuck 2x5 B with chin tuck  2x5 B with chin tuck 2x5 B with chin tuck 2x5 B with chin tuck 2x5 B with chin tuck    Supine Serratus Punch  2x10 5" 2# 4x5 5" 2# with chin tuck 4x5 5" 2# with chin tuck  2x10 5" 2# 2x10 5" 2# 2x10 5" 3" 2x10 5" 3#    No money  2x10 OTB 2x10 GTB 2x10 GTB    2x10 3" GTB 2x10 3" GTB    TB Rows/Ext     2x10 3" GTB  2x10 3" GTB 2x10 3"   GTB 2x10 3"   GTB [place] 15# 3" just rows   Ther Activity             UBE  2'/2' 2'/2' 2'/2' 3'/3' 3'/3' 3'/3' lvl 1 5 3'/3' lvl 1 5  3'/3' lvl 1 8  3'/3' L1 5                Gait Training                                       Modalities             Heat (prn)

## 2022-08-11 ENCOUNTER — OFFICE VISIT (OUTPATIENT)
Dept: PHYSICAL THERAPY | Facility: CLINIC | Age: 62
End: 2022-08-11
Payer: COMMERCIAL

## 2022-08-11 DIAGNOSIS — M54.12 CERVICAL RADICULOPATHY: ICD-10-CM

## 2022-08-11 DIAGNOSIS — M54.2 CERVICALGIA: ICD-10-CM

## 2022-08-11 DIAGNOSIS — M54.2 NECK PAIN, CHRONIC: Primary | ICD-10-CM

## 2022-08-11 DIAGNOSIS — G89.29 NECK PAIN, CHRONIC: Primary | ICD-10-CM

## 2022-08-11 PROCEDURE — 97110 THERAPEUTIC EXERCISES: CPT

## 2022-08-11 NOTE — PROGRESS NOTES
Daily Note     Today's date: 2022  Patient name: Shila Stoddard  : 1960  MRN: [id number]  Referring provider: Renay Renee  Dx:   Encounter Diagnosis     ICD-10-CM    1  Neck pain, chronic  M54 2     G89 29    2  Cervicalgia  M54 2    3  Cervical radiculopathy  M54 12        Start Time: 0806  Stop Time: 5836  Total time in clinic (min): 40 minutes    Subjective: Pt denies any pain today and reports improvement in UE numbness symptoms  Objective: See treatment diary below    Assessment: Tolerated treatment well  Challenged with prone Y  Denies pain or numbness throughout session  Patient demonstrated fatigue post treatment and would benefit from continued PT to ensure continued relief, increase strength and provide HEP education  Plan: Continue per plan of care  Progress treatment as tolerated         Precautions: Neurosyncope (managed with medication) but increased risk of syncope, motor symptoms and BP      Manuals 7/1 7/6 7/8 7/27 7/29 8/1 8/3 8/8 8/11   Cervical STM HD (supine) HD (supine) HD (supine) HD (supine)        Thoracic STM     HD (prone) NA (prone) LQ (Prone) HD (prone)    PA Mobs C/S & T/S  HD C/S (prone) HD (prone)  HD (prone) NA (prone) MK (Prone) HD (prone)    Cervical Distraction HD  1 rep in seated         Neuro Re-Ed            HEP Education HD           Median Nerve ULTT HD (+ b/l )            Ulnar Nerve ULTT HD (+ b/l )            Median Nerve Glides x10 B HEP x10 B  2x10 B  2x10 B NV     Ulnar Nerve Glides x10 B HEP x10 B  2x10 B  2x10 B NV                             Ther Ex            Cervical AROM            Upper Trap Stretch  30"x2 B 20" x3 30" x2 B  30"x2 B 30"x2 B     Levator Stretch   20" x3         Chin Tucks 3" x15 (supine) HEP 3" 2x10 (supine) 3" 2x10 3" 2x10 (supine) 3" 2x10 (supine) 3" 2x10 (supine) 3" 2x10 (supine) 5" 2x10 (supine) 5" 2x10 (supine)   Retraction with Ext            Cervical SNAGs            Cervical Rotation with Towel            Pec Stretch            Scap Retractions   Prone x8  Prone 3" 2x10 Prone 3"2x10 Prone 3"2x10 Prone 3"2x10 Prone 3"2x10   Prone I, Y, T     T 3" 2x10 T 3" 2x10 T 3" 2x10 T 3" 2x10 T, Y 3" 2x10   S/l open book     x10 B  x10 B 2x10, 5" B 2x10 5" B x10 5" B   Supine with foam roll            Supine Horz Abd 4x5 OTB with chin tuck 4x5 OTB with chin tuck  2x10 OTB 2x10 OTB 2x10 3"   OTB 2x10 3"   OTB 2x10 OTB 4x5 GTB with chin tuck   Supine D2 Flexion x10 B with chin tuck 2x5 B with chin tuck  2x5 B with chin tuck 2x5 B with chin tuck 2x5 B with chin tuck 2x5 B with chin tuck  2x5 B with chin tuck   Supine Serratus Punch 4x5 5" 2# with chin tuck 4x5 5" 2# with chin tuck  2x10 5" 2# 2x10 5" 2# 2x10 5" 3" 2x10 5" 3#  4x5 5" 3# with chin tuck   No money 2x10 GTB 2x10 GTB    2x10 3" GTB 2x10 3" GTB  2x10 GTB   TB Rows/Ext   2x10 3" GTB  2x10 3" GTB 2x10 3"   GTB 2x10 3"   GTB [place] 15# 3" just rows [place] 15# 3" just rows   Ther Activity            UBE 2'/2' 2'/2' 3'/3' 3'/3' 3'/3' lvl 1 5 3'/3' lvl 1 5  3'/3' lvl 1 8  3'/3' L1 5 3'/3' L1 5               Gait Training                                    Modalities            Heat (prn)

## 2022-08-17 ENCOUNTER — OFFICE VISIT (OUTPATIENT)
Dept: PHYSICAL THERAPY | Facility: CLINIC | Age: 62
End: 2022-08-17
Payer: COMMERCIAL

## 2022-08-17 DIAGNOSIS — M54.2 CERVICALGIA: ICD-10-CM

## 2022-08-17 DIAGNOSIS — G89.29 NECK PAIN, CHRONIC: Primary | ICD-10-CM

## 2022-08-17 DIAGNOSIS — M54.12 CERVICAL RADICULOPATHY: ICD-10-CM

## 2022-08-17 DIAGNOSIS — M54.2 NECK PAIN, CHRONIC: Primary | ICD-10-CM

## 2022-08-17 PROCEDURE — 97110 THERAPEUTIC EXERCISES: CPT

## 2022-08-17 NOTE — PROGRESS NOTES
Daily Note     Today's date: 2022  Patient name: Ramon Burgess  : 1960  MRN: [id number]  Referring provider: Marcelino Jamison  Dx:   Encounter Diagnosis     ICD-10-CM    1  Neck pain, chronic  M54 2     G89 29    2  Cervicalgia  M54 2    3  Cervical radiculopathy  M54 12        Start Time: 7452  Stop Time: 08  Total time in clinic (min): 38 minutes    Subjective: Pt denies neck pain today  Pt reports he was having some pain in forearms last night and now is just feeling a dull ache in forearms this morning  Objective: See treatment diary below    Assessment: Increased intensity of some exercises today by adding weight or hold  Tolerated treatment well  No increase in ache in forearms reported througout session  Patient demonstrated fatigue post treatment and would benefit from continued PT to continue to strengthen postural muscles and transition to HEP in order to maintain gains made in therapy  Plan: Continue per plan of care  Progress treatment as tolerated         Precautions: Neurosyncope (managed with medication) but increased risk of syncope, motor symptoms and BP      Manuals 7/1 7/6 7/8 7/27 7/29 8/1 8/3 8/8 8/11 8/17   Cervical STM HD (supine) HD (supine) HD (supine) HD (supine)         Thoracic STM     HD (prone) NA (prone) LQ (Prone) HD (prone)     PA Mobs C/S & T/S  HD C/S (prone) HD (prone)  HD (prone) NA (prone) MK (Prone) HD (prone)     Cervical Distraction HD  1 rep in seated          Neuro Re-Ed             HEP Education HD            Median Nerve ULTT HD (+ b/l )             Ulnar Nerve ULTT HD (+ b/l )             Median Nerve Glides x10 B HEP x10 B  2x10 B  2x10 B NV      Ulnar Nerve Glides x10 B HEP x10 B  2x10 B  2x10 B NV      Ther Ex             Cervical AROM             Upper Trap Stretch  30"x2 B 20" x3 30" x2 B  30"x2 B 30"x2 B      Levator Stretch   20" x3          Chin Tucks 3" x15 (supine) HEP 3" 2x10 (supine) 3" 2x10 3" 2x10 (supine) 3" 2x10 (supine) 3" 2x10 (supine) 3" 2x10 (supine) 5" 2x10 (supine) 5" 2x10 (supine) 5" 2x10 (supine)   Retraction with Ext             Cervical SNAGs             Cervical Rotation with Towel             Pec Stretch             Scap Retractions   Prone x8  Prone 3" 2x10 Prone 3"2x10 Prone 3"2x10 Prone 3"2x10 Prone 3"2x10 Prone 3"2x10   Prone I, Y, T     T 3" 2x10 T 3" 2x10 T 3" 2x10 T 3" 2x10 T, Y 3" 2x10 T, Y 3" 2x10   S/l open book     x10 B  x10 B 2x10, 5" B 2x10 5" B x10 5" B x10 5" B   Supine with foam roll             Supine Horz Abd 4x5 OTB with chin tuck 4x5 OTB with chin tuck  2x10 OTB 2x10 OTB 2x10 3"   OTB 2x10 3"   OTB 2x10 OTB 4x5 GTB with chin tuck 4x5 GTB with chin tuck   Supine D2 Flexion x10 B with chin tuck 2x5 B with chin tuck  2x5 B with chin tuck 2x5 B with chin tuck 2x5 B with chin tuck 2x5 B with chin tuck  2x5 B with chin tuck 2x5 B with chin tuck OTB   Supine Serratus Punch 4x5 5" 2# with chin tuck 4x5 5" 2# with chin tuck  2x10 5" 2# 2x10 5" 2# 2x10 5" 3" 2x10 5" 3#  4x5 5" 3# with chin tuck 4x5 5" 3# with chin tuck   No money 2x10 GTB 2x10 GTB    2x10 3" GTB 2x10 3" GTB  2x10 GTB 2x10 GTB 3" hold   TB Rows/Ext   2x10 3" GTB  2x10 3" GTB 2x10 3"   GTB 2x10 3"   GTB [place] 15# 3" just rows [place] 15# 3" just rows heron 17# rows 3" 2x10   Flexion wall slide with lift off          3" x15   Ther Activity             UBE 2'/2' 2'/2' 3'/3' 3'/3' 3'/3' lvl 1 5 3'/3' lvl 1 5  3'/3' lvl 1 8  3'/3' L1 5 3'/3' L1 5 3'/3' L1 5   Modalities             Heat (prn)

## 2022-08-19 ENCOUNTER — OFFICE VISIT (OUTPATIENT)
Dept: PHYSICAL THERAPY | Facility: CLINIC | Age: 62
End: 2022-08-19
Payer: COMMERCIAL

## 2022-08-19 DIAGNOSIS — M54.2 CERVICALGIA: ICD-10-CM

## 2022-08-19 DIAGNOSIS — G89.29 NECK PAIN, CHRONIC: Primary | ICD-10-CM

## 2022-08-19 DIAGNOSIS — M54.2 NECK PAIN, CHRONIC: Primary | ICD-10-CM

## 2022-08-19 DIAGNOSIS — M54.12 CERVICAL RADICULOPATHY: ICD-10-CM

## 2022-08-19 PROCEDURE — 97110 THERAPEUTIC EXERCISES: CPT

## 2022-08-19 PROCEDURE — 97112 NEUROMUSCULAR REEDUCATION: CPT

## 2022-08-19 NOTE — PROGRESS NOTES
Daily Note     Today's date: 2022  Patient name: Maria Luz Trujillo  : 1960  MRN: [id number]  Referring provider: Paulina Molina  Dx:   Encounter Diagnosis     ICD-10-CM    1  Neck pain, chronic  M54 2     G89 29    2  Cervicalgia  M54 2    3  Cervical radiculopathy  M54 12        Start Time:   Stop Time: 829  Total time in clinic (min): 42 minutes    Subjective: Pt reports he's feeling pretty good today but did have some numbness into ulnar side of R hand last night  Objective: See treatment diary below    Assessment: Nerve glides today to address reported numbness  Tolerated treatment well  Patient demonstrated fatigue post treatment and would benefit from continued PT to ensure continued progress and transition to independent HEP  Plan: Continue per plan of care  Progress treatment as tolerated         Precautions: Neurosyncope (managed with medication) but increased risk of syncope, motor symptoms and BP      Manuals 7/8 7/27 7/29 8/1 8/3 8/8 8/11 8/17 8/19   Cervical STM HD (supine) HD (supine)          Thoracic STM   HD (prone) NA (prone) LQ (Prone) HD (prone)      PA Mobs C/S & T/S HD (prone)  HD (prone) NA (prone) MK (Prone) HD (prone)      Cervical Distraction 1 rep in seated           Neuro Re-Ed            HEP Education            Median Nerve ULTT            Ulnar Nerve ULTT            Median Nerve Glides  2x10 B  2x10 B NV    2x20 B   Ulnar Nerve Glides  2x10 B  2x10 B NV    2x10 B   Ther Ex            Cervical AROM            Upper Trap Stretch 20" x3 30" x2 B  30"x2 B 30"x2 B       Levator Stretch 20" x3           Chin Tucks 3" 2x10 3" 2x10 (supine) 3" 2x10 (supine) 3" 2x10 (supine) 3" 2x10 (supine) 5" 2x10 (supine) 5" 2x10 (supine) 5" 2x10 (supine) D/C   Retraction with Ext            Cervical SNAGs            Cervical Rotation with Towel            Pec Stretch            Scap Retractions Prone x8  Prone 3" 2x10 Prone 3"2x10 Prone 3"2x10 Prone 3"2x10 Prone 3"2x10 Prone 3"2x10 Prone 3"2x10   Prone I, Y, T   T 3" 2x10 T 3" 2x10 T 3" 2x10 T 3" 2x10 T, Y 3" 2x10 T, Y 3" 2x10 T, Y 3" 2x10   S/l open book   x10 B  x10 B 2x10, 5" B 2x10 5" B x10 5" B x10 5" B x10 5" B   Supine with foam roll            Supine Horz Abd  2x10 OTB 2x10 OTB 2x10 3"   OTB 2x10 3"   OTB 2x10 OTB 4x5 GTB with chin tuck 4x5 GTB with chin tuck 4x5 GTB with chin tuck   Supine D2 Flexion  2x5 B with chin tuck 2x5 B with chin tuck 2x5 B with chin tuck 2x5 B with chin tuck  2x5 B with chin tuck 2x5 B with chin tuck OTB 3x5 B with chin tuck OTB   Supine Serratus Punch  2x10 5" 2# 2x10 5" 2# 2x10 5" 3" 2x10 5" 3#  4x5 5" 3# with chin tuck 4x5 5" 3# with chin tuck 4x5 5" 4# with chin tuck   No money    2x10 3" GTB 2x10 3" GTB  2x10 GTB 2x10 GTB 3" hold    TB Rows/Ext 2x10 3" GTB  2x10 3" GTB 2x10 3"   GTB 2x10 3"   GTB [place] 15# 3" just rows [place] 15# 3" just rows heron 17# rows 3" 2x10 heron 17# rows 3" 2x10   Flexion wall slide with lift off        3" x15    Ther Activity            UBE 3'/3' 3'/3' 3'/3' lvl 1 5 3'/3' lvl 1 5  3'/3' lvl 1 8  3'/3' L1 5 3'/3' L1 5 3'/3' L1 5 3'/3' L1 6   Modalities            Heat (prn)

## 2022-08-23 ENCOUNTER — OFFICE VISIT (OUTPATIENT)
Dept: PHYSICAL THERAPY | Facility: CLINIC | Age: 62
End: 2022-08-23
Payer: COMMERCIAL

## 2022-08-23 DIAGNOSIS — M54.2 CERVICALGIA: ICD-10-CM

## 2022-08-23 DIAGNOSIS — M54.2 NECK PAIN, CHRONIC: Primary | ICD-10-CM

## 2022-08-23 DIAGNOSIS — M54.12 CERVICAL RADICULOPATHY: ICD-10-CM

## 2022-08-23 DIAGNOSIS — G89.29 NECK PAIN, CHRONIC: Primary | ICD-10-CM

## 2022-08-23 PROCEDURE — 97112 NEUROMUSCULAR REEDUCATION: CPT

## 2022-08-23 PROCEDURE — 97110 THERAPEUTIC EXERCISES: CPT

## 2022-08-23 NOTE — PROGRESS NOTES
Daily Note     Today's date: 2022  Patient name: Zuleika Romero  : 1960  MRN: [id number]  Referring provider: Sadi Lopez  Dx:   Encounter Diagnosis     ICD-10-CM    1  Neck pain, chronic  M54 2     G89 29    2  Cervicalgia  M54 2    3  Cervical radiculopathy  M54 12        Start Time: 0801  Stop Time: 0845  Total time in clinic (min): 44 minutes    Subjective: Pt reports he continues to wake up with numbness/pain/ache in b/l forearms  Improves throughout the day but still aches  Pt denies any proximal symptoms  Objective: See treatment diary below    Updated HEP: [employer]; Access Code: [email or web address]    Assessment: Discussed importance of continuing with nerve glides to continue to address neural tension  Reviewed updated HEP today to prepare for upcoming discharge  Tolerated treatment well  Patient demonstrated fatigue post treatment and would benefit from continued PT    Plan: Continue per plan of care  Potential discharge next visit       Precautions: Neurosyncope (managed with medication) but increased risk of syncope, motor symptoms and BP      Manuals 8/1 8/3 8/8 8/11 8/17 8/19 8/23   Cervical STM          Thoracic STM NA (prone) LQ (Prone) HD (prone)       PA Mobs C/S & T/S NA (prone) MK (Prone) HD (prone)       Cervical Distraction          Neuro Re-Ed          HEP Education       HD   Median Nerve ULTT          Ulnar Nerve ULTT          Median Nerve Glides 2x10 B NV    2x20 B 2x10 B HEP   Ulnar Nerve Glides 2x10 B NV    2x10 B 2x10 HEP   Ther Ex          Cervical AROM          Upper Trap Stretch 30"x2 B 30"x2 B     2x30" B HEP   Levator Stretch       2x30" HEP   Chin Tucks 3" 2x10 (supine) 3" 2x10 (supine) 5" 2x10 (supine) 5" 2x10 (supine) 5" 2x10 (supine) D/C HEP supine & seated   Retraction with Ext          Cervical SNAGs          Cervical Rotation with Towel          Pec Stretch          Scap Retractions Prone 3"2x10 Prone 3"2x10 Prone 3"2x10 Prone 3"2x10 Prone 3"2x10 Prone 3"2x10 Prone 3"2x10 HEP   Prone I, Y, T T 3" 2x10 T 3" 2x10 T 3" 2x10 T, Y 3" 2x10 T, Y 3" 2x10 T, Y 3" 2x10 T 3" 2x10 HEP   S/l open book x10 B 2x10, 5" B 2x10 5" B x10 5" B x10 5" B x10 5" B x10 5" B HEP   Supine with foam roll          Supine Horz Abd 2x10 3"   OTB 2x10 3"   OTB 2x10 OTB 4x5 GTB with chin tuck 4x5 GTB with chin tuck 4x5 GTB with chin tuck 4x5 GTB with chin tuck HEP   Supine D2 Flexion 2x5 B with chin tuck 2x5 B with chin tuck  2x5 B with chin tuck 2x5 B with chin tuck OTB 3x5 B with chin tuck OTB 4x5 GTB with chin tuck   Supine Serratus Punch 2x10 5" 3" 2x10 5" 3#  4x5 5" 3# with chin tuck 4x5 5" 3# with chin tuck 4x5 5" 4# with chin tuck 4x5 5" GTB with chin tuck   No money 2x10 3" GTB 2x10 3" GTB  2x10 GTB 2x10 GTB 3" hold  2x10 GTB 3" hold HEP   TB Rows/Ext 2x10 3"   GTB 2x10 3"   GTB [place] 15# 3" just rows [place] 15# 3" just rows jovany 17# rows 3" 2x10 jovany 17# rows 3" 2x10    Flexion wall slide with lift off     3" x15     Ther Activity          UBE 3'/3' lvl 1 5  3'/3' lvl 1 8  3'/3' L1 5 3'/3' L1 5 3'/3' L1 5 3'/3' L1 6 3'/3' L1 6   Modalities          Heat (prn)

## 2022-08-30 ENCOUNTER — OFFICE VISIT (OUTPATIENT)
Dept: PHYSICAL THERAPY | Facility: CLINIC | Age: 62
End: 2022-08-30
Payer: COMMERCIAL

## 2022-08-30 DIAGNOSIS — G89.29 NECK PAIN, CHRONIC: Primary | ICD-10-CM

## 2022-08-30 DIAGNOSIS — M54.2 CERVICALGIA: ICD-10-CM

## 2022-08-30 DIAGNOSIS — M54.12 CERVICAL RADICULOPATHY: ICD-10-CM

## 2022-08-30 DIAGNOSIS — M54.2 NECK PAIN, CHRONIC: Primary | ICD-10-CM

## 2022-08-30 PROCEDURE — 97110 THERAPEUTIC EXERCISES: CPT

## 2022-08-30 NOTE — PROGRESS NOTES
Daily Note     Today's date: 2022  Patient name: Leah Talbert  : 1960  MRN: [id number]  Referring provider: Olga Walker  Dx:   Encounter Diagnosis     ICD-10-CM    1  Neck pain, chronic  M54 2     G89 29    2  Cervicalgia  M54 2    3  Cervical radiculopathy  M54 12        Start Time: 08  Stop Time: 08  Total time in clinic (min): 20 minutes    Subjective: Pt denies any neck pain, but reports continued ache in bilateral UEs below the elbow, which wakes him up when sleeping  Symptoms improve when sleeps sitting up in his recliner  Pt states he has been consistent with HEP and has no issues with exercises  Objective: See treatment diary below    Assessment: Tolerated treatment well  Patient exhibited good technique with therapeutic exercises indicating independence with HEP  Pt denies any neck pain or functional limitations related to his neck, indicating progress since starting therapy  Neural tension symptoms in bilateral arms below the elbow continue to persist, especially at night  Pt encouraged to continue with nerve flossing to address these symptoms and follow up with physician if necessary  Plan: Pt will be discharged from therapy today  Pt encouraged to continue with HEP, focusing on nerve glides   If UE symptoms don't resolve, pt should discuss continued [facility]       Precautions: Neurosyncope (managed with medication) but increased risk of syncope, motor symptoms and BP      Manuals 8/1 8/3 8/8 8/11 8/17 8/19 8/23 8/30   Cervical STM           Thoracic STM NA (prone) LQ (Prone) HD (prone)        PA Mobs C/S & T/S NA (prone) MK (Prone) HD (prone)        Cervical Distraction           Neuro Re-Ed           HEP/POC Education       HD HD   Median Nerve ULTT           Ulnar Nerve ULTT           Median Nerve Glides 2x10 B NV    2x20 B 2x10 B HEP 2x10 B   Ulnar Nerve Glides 2x10 B NV    2x10 B 2x10 HEP 2x10 B   Ther Ex           Cervical AROM           Upper Trap Stretch 30"x2 B 30"x2 B     2x30" B HEP    Levator Stretch       2x30" HEP    Chin Tucks 3" 2x10 (supine) 3" 2x10 (supine) 5" 2x10 (supine) 5" 2x10 (supine) 5" 2x10 (supine) D/C HEP supine & seated    Retraction with Ext           Cervical SNAGs           Cervical Rotation with Towel           Pec Stretch           Scap Retractions Prone 3"2x10 Prone 3"2x10 Prone 3"2x10 Prone 3"2x10 Prone 3"2x10 Prone 3"2x10 Prone 3"2x10 HEP prone 3" 2x10   Prone I, Y, T T 3" 2x10 T 3" 2x10 T 3" 2x10 T, Y 3" 2x10 T, Y 3" 2x10 T, Y 3" 2x10 T 3" 2x10 HEP T 3" 2x10   S/l open book x10 B 2x10, 5" B 2x10 5" B x10 5" B x10 5" B x10 5" B x10 5" B HEP    Supine with foam roll           Supine Horz Abd 2x10 3"   OTB 2x10 3"   OTB 2x10 OTB 4x5 GTB with chin tuck 4x5 GTB with chin tuck 4x5 GTB with chin tuck 4x5 GTB with chin tuck HEP    Supine D2 Flexion 2x5 B with chin tuck 2x5 B with chin tuck  2x5 B with chin tuck 2x5 B with chin tuck OTB 3x5 B with chin tuck OTB 4x5 GTB with chin tuck    Supine Serratus Punch 2x10 5" 3" 2x10 5" 3#  4x5 5" 3# with chin tuck 4x5 5" 3# with chin tuck 4x5 5" 4# with chin tuck 4x5 5" GTB with chin tuck    No money 2x10 3" GTB 2x10 3" GTB  2x10 GTB 2x10 GTB 3" hold  2x10 GTB 3" hold HEP    TB Rows/Ext 2x10 3"   GTB 2x10 3"   GTB Her[place] 15# 3" just rows Her[place] 15# 3" just rows heron 17# rows 3" 2x10 heron 17# rows 3" 2x10  BTB 3" 2x10   Flexion wall slide with lift off     3" x15      Ther Activity           UBE 3'/3' lvl 1 5  3'/3' lvl 1 8  3'/3' L1 5 3'/3' L1 5 3'/3' L1 5 3'/3' L1 6 3'/3' L1 6 2'/2' L1 6   Modalities           Heat (prn)

## 2023-12-18 ENCOUNTER — TRANSCRIBE ORDERS (OUTPATIENT)
Dept: PHYSICAL THERAPY | Facility: CLINIC | Age: 63
End: 2023-12-18

## 2023-12-18 ENCOUNTER — EVALUATION (OUTPATIENT)
Dept: PHYSICAL THERAPY | Facility: CLINIC | Age: 63
End: 2023-12-18
Payer: COMMERCIAL

## 2023-12-18 DIAGNOSIS — Z98.890 S/P CARPAL TUNNEL RELEASE: Primary | ICD-10-CM

## 2023-12-18 DIAGNOSIS — Z98.890 S/P CUBITAL TUNNEL RELEASE: ICD-10-CM

## 2023-12-18 DIAGNOSIS — M79.601 PAIN OF RIGHT ARM: ICD-10-CM

## 2023-12-18 PROCEDURE — 97162 PT EVAL MOD COMPLEX 30 MIN: CPT

## 2023-12-18 PROCEDURE — 97110 THERAPEUTIC EXERCISES: CPT

## 2023-12-18 NOTE — PROGRESS NOTES
PT Evaluation     Today's date: 2023  Patient name: Eagle Nava  : 1960  MRN: [id number]  Referring provider: Ra Candelario DO  Dx:   Encounter Diagnosis     ICD-10-CM    1. S/P carpal tunnel release  Z98.890       2. S/P cubital tunnel release  Z98.890       3. Pain of right arm  M79.601           Start Time: 1445  Stop Time: 1530  Total time in clinic (min): 45 minutes    Assessment  Assessment details: Patient is a 62 y/o male presenting to PT s/p R carpal tunnel release and ulnar nerve transposition on 11/15/23. Upon clinical exam, patient presents with decreased R wrist ROM, decreased R elbow ROM, decreased R wrist strength, decreased R elbow strength, decreased R  strength, and decreased scar mobility. These impairments limit the patient with gripping, lifting, moving his elbow, and moving his wrist. Exam findings and clinical signs and symptoms are consistent with post-op status. Patient will benefit from physical therapy to address the above impairments and maximize functional mobility.     Impairments: abnormal muscle tone, abnormal or restricted ROM, abnormal movement, activity intolerance, impaired physical strength, lacks appropriate home exercise program and pain with function    Symptom irritability: moderateUnderstanding of Dx/Px/POC: good   Prognosis: good    Goals  STG - to be met by week 4  1. Patient will be independent with HEP throughout therapy to prepare for eventual transition to maintenance program.  2. Patient will improve subjective pain to <=4/10 at worst to improve QOL.  3. Patient will improve R elbow extension AROM to 0 deg to perform ADLs with less difficulty.  4. Patient will improve R wrist AROM to WFL in all directions to perform ADLs with less difficulty.    LTG - to be met by discharge   1. Patient will improve R  strength to >=60 lbs to improve lifting ability.  2. Patient will improve RUE strength to >=4+/5 to improve functional mobility.  3. Patient  will improve FOTO score to age matched norm to demonstrate functional improvements.  4. Patient will be able to perform yard work without difficulty to return to [place].        Plan  Patient would benefit from: skilled physical therapy  Planned modality interventions: cryotherapy and thermotherapy: hydrocollator packs  Planned therapy interventions: activity modification, flexibility, functional ROM exercises, graded activity, graded exercise, home exercise program, IASTM, joint mobilization, manual therapy, neuromuscular re-education, patient education, strengthening, stretching, therapeutic activities and therapeutic exercise  Frequency: 2x week  Duration in weeks: 10  Treatment plan discussed with: patient        Subjective Evaluation    History of Present Illness  Date of surgery: 11/15/2023  Mechanism of injury: surgery  Mechanism of injury: Patient presents s/p R carpal tunnel release and ulnar nerve transposition on 11/15/23. Reports he was in a sling for 3 weeks post-op and notes stiffness and bruising from this in his R elbow. Reports he had the stitches removed on 12/5/23. Patient reports he has had chronic neck pain and (B) radicular symptoms from his elbows down to his hands for several years. Reports he had PT in the past which helped with his neck pain but did not change his lower arm radicular symptoms. Reports he had a carpal tunnel and cubital tunnel release performed in 2014 which helped but then his symptoms returned. Patient reports difficulty with gripping, lifting, moving his elbow, and moving his wrist due to his pain and stiffness. Reports mild intermittent N/T in his R 3rd and 4th digits. Reports he has the same problem on his L side but not as severe. Reports Gabapentin helps with his pain. Reports he does not have an MD follow up scheduled.             Recurrent probem    Quality of life: good    Patient Goals  Patient goals for therapy: decreased pain, increased motion, increased strength  "and independence with ADLs/IADLs  Patient goal: \"get back to how I was\", yard work  Pain  Current pain ratin  At best pain ratin  At worst pain ratin  Location: R elbow and wrist  Quality: tight  Aggravating factors: lifting  Progression: improved    Social Support    Employment status: not working  Hand dominance: right    Treatments  Previous treatment: medication and physical therapy  Current treatment: physical therapy        Objective     Observations     Additional Observation Details  R wrist incision clean, dry, intact  R elbow incision clean, dry, intact with ecchymosis in his R medial forearm and upper arm    Palpation     Additional Palpation Details  TTP R triceps, carpal tunnel, wrist flexor tendons    Neurological Testing     Additional Neurological Details  BUE sensation intact and equal to light touch     Active Range of Motion     Left Elbow   Flexion: 135 degrees   Extension: 0 degrees   Forearm supination: WFL  Forearm pronation: WFL    Right Elbow   Flexion: 120 degrees with pain  Extension: -30 degrees with pain  Forearm supination: WFL  Forearm pronation: WFL and with pain    Additional Active Range of Motion Details  R Wrist AROM:   Flexion: 40 deg with pain  Extension: 30 deg with pain  Ulnar deviation: 10 deg  Radial deviation: 15 deg    L Wrist AROM: WFL no pain all directions        Strength/Myotome Testing     Left Elbow   Flexion: 5  Extension: 5    Right Elbow   Flexion: 4-  Extension: 4-    Additional Strength Details  L wrist strength:   Flexion: 5/5  Extension: 5/5  Ulnar deviation: 5/5  Radial deviation: 5/5    R wrist strength:   Flexion: 4-/5 with pain  Extension: 4-/5  Ulnar deviation: 4-/5  Radial deviation: 4-/5    General Comments:      Elbow Comments   L  strength: 100lb  R  strength: 35lb      Flowsheet Rows      Flowsheet Row Most Recent Value   PT/OT G-Codes    Current Score 50   Projected Score 71               Precautions: s/p R carpal tunnel release " and ulnar nerve transposition on 11/15/23, 10lb lifting restriction    Education: use of ice, HEP, scar mobilization      Manuals 12/18            R wrist/elbow PROM             Scar mobilization                                       Neuro Re-Ed             R hand splay             R thumb opposition              Tendon gliding (tabletop to hook )                                                                 Ther Ex             Wrist flx/ext AROM HEP            Wrist UD/RD AROM HEP            Supination/ pronation AROM             R wrist extensor/elbow ext stretch HEP            Prolonged R elbow ext stretch supine HEP            R elbow AROM HEP                                      Ther Activity             Putty squeeze HEP            DigiGrip             Gait Training                                       Modalities

## 2023-12-18 NOTE — LETTER
2023    Ra Candelario DO  [address] [facility] [address]    Patient: Eagle Nava   YOB: 1960   Date of Visit: 2023     Encounter Diagnosis     ICD-10-CM    1. S/P carpal tunnel release  Z98.890       2. S/P cubital tunnel release  Z98.890       3. Pain of right arm  M79.601           Dear Dr. Candelario:    Thank you for your recent referral of Eagle Nava. Please review the attached evaluation summary from Eagle's recent visit.     Please verify that you agree with the plan of care by signing the attached order.     If you have any questions or concerns, please do not hesitate to call.     I sincerely appreciate the opportunity to share in the care of one of your patients and hope to have another opportunity to work with you in the near future.       Sincerely,    Emily Ingram, PT      Referring Provider:      I certify that I have read the below Plan of Care and certify the need for these services furnished under this plan of treatment while under my care.                    Ra Candelario DO  [address] [facility] [address]  Via Fax: [phone]          PT Evaluation     Today's date: 2023  Patient name: Eagle Nava  : 1960  MRN: [id number]  Referring provider: Ra Candelario DO  Dx:   Encounter Diagnosis     ICD-10-CM    1. S/P carpal tunnel release  Z98.890       2. S/P cubital tunnel release  Z98.890       3. Pain of right arm  M79.601           Start Time: 1445  Stop Time: 1530  Total time in clinic (min): 45 minutes    Assessment  Assessment details: Patient is a 64 y/o male presenting to PT s/p R carpal tunnel release and ulnar nerve transposition on 11/15/23. Upon clinical exam, patient presents with decreased R wrist ROM, decreased R elbow ROM, decreased R wrist strength, decreased R elbow strength, decreased R  strength, and decreased scar mobility. These impairments limit the patient with gripping, lifting,  moving his elbow, and moving his wrist. Exam findings and clinical signs and symptoms are consistent with post-op status. Patient will benefit from physical therapy to address the above impairments and maximize functional mobility.     Impairments: abnormal muscle tone, abnormal or restricted ROM, abnormal movement, activity intolerance, impaired physical strength, lacks appropriate home exercise program and pain with function    Symptom irritability: moderateUnderstanding of Dx/Px/POC: good   Prognosis: good    Goals  STG - to be met by week 4  1. Patient will be independent with HEP throughout therapy to prepare for eventual transition to maintenance program.  2. Patient will improve subjective pain to <=4/10 at worst to improve QOL.  3. Patient will improve R elbow extension AROM to 0 deg to perform ADLs with less difficulty.  4. Patient will improve R wrist AROM to WFL in all directions to perform ADLs with less difficulty.    LTG - to be met by discharge   1. Patient will improve R  strength to >=60 lbs to improve lifting ability.  2. Patient will improve RUE strength to >=4+/5 to improve functional mobility.  3. Patient will improve FOTO score to age matched norm to demonstrate functional improvements.  4. Patient will be able to perform yard work without difficulty to return to [facility].        Plan  Patient would benefit from: skilled physical therapy  Planned modality interventions: cryotherapy and thermotherapy: hydrocollator packs  Planned therapy interventions: activity modification, flexibility, functional ROM exercises, graded activity, graded exercise, home exercise program, IASTM, joint mobilization, manual therapy, neuromuscular re-education, patient education, strengthening, stretching, therapeutic activities and therapeutic exercise  Frequency: 2x week  Duration in weeks: 10  Treatment plan discussed with: patient        Subjective Evaluation    History of Present Illness  Date of surgery:  "11/15/2023  Mechanism of injury: surgery  Mechanism of injury: Patient presents s/p R carpal tunnel release and ulnar nerve transposition on 11/15/23. Reports he was in a sling for 3 weeks post-op and notes stiffness and bruising from this in his R elbow. Reports he had the stitches removed on 23. Patient reports he has had chronic neck pain and (B) radicular symptoms from his elbows down to his hands for several years. Reports he had PT in the past which helped with his neck pain but did not change his lower arm radicular symptoms. Reports he had a carpal tunnel and cubital tunnel release performed in  which helped but then his symptoms returned. Patient reports difficulty with gripping, lifting, moving his elbow, and moving his wrist due to his pain and stiffness. Reports mild intermittent N/T in his R 3rd and 4th digits. Reports he has the same problem on his L side but not as severe. Reports Gabapentin helps with his pain. Reports he does not have an MD follow up scheduled.             Recurrent probem    Quality of life: good    Patient Goals  Patient goals for therapy: decreased pain, increased motion, increased strength and independence with ADLs/IADLs  Patient goal: \"get back to how I was\", yard work  Pain  Current pain ratin  At best pain ratin  At worst pain ratin  Location: R elbow and wrist  Quality: tight  Aggravating factors: lifting  Progression: improved    Social Support    Employment status: not working  Hand dominance: right    Treatments  Previous treatment: medication and physical therapy  Current treatment: physical therapy        Objective     Observations     Additional Observation Details  R wrist incision clean, dry, intact  R elbow incision clean, dry, intact with ecchymosis in his R medial forearm and upper arm    Palpation     Additional Palpation Details  TTP R triceps, carpal tunnel, wrist flexor tendons    Neurological Testing     Additional Neurological " Details  BUE sensation intact and equal to light touch     Active Range of Motion     Left Elbow   Flexion: 135 degrees   Extension: 0 degrees   Forearm supination: WFL  Forearm pronation: WFL    Right Elbow   Flexion: 120 degrees with pain  Extension: -30 degrees with pain  Forearm supination: WFL  Forearm pronation: WFL and with pain    Additional Active Range of Motion Details  R Wrist AROM:   Flexion: 40 deg with pain  Extension: 30 deg with pain  Ulnar deviation: 10 deg  Radial deviation: 15 deg    L Wrist AROM: WFL no pain all directions        Strength/Myotome Testing     Left Elbow   Flexion: 5  Extension: 5    Right Elbow   Flexion: 4-  Extension: 4-    Additional Strength Details  L wrist strength:   Flexion: 5/5  Extension: 5/5  Ulnar deviation: 5/5  Radial deviation: 5/5    R wrist strength:   Flexion: 4-/5 with pain  Extension: 4-/5  Ulnar deviation: 4-/5  Radial deviation: 4-/5    General Comments:      Elbow Comments   L  strength: 100lb  R  strength: 35lb      Flowsheet Rows      Flowsheet Row Most Recent Value   PT/OT G-Codes    Current Score 50   Projected Score 71               Precautions: s/p R carpal tunnel release and ulnar nerve transposition on 11/15/23, 10lb lifting restriction    Education: use of ice, HEP, scar mobilization      Manuals 12/18            R wrist/elbow PROM             Scar mobilization                                       Neuro Re-Ed             R hand splay             R thumb opposition              Tendon gliding (tabletop to hook )                                                                 Ther Ex             Wrist flx/ext AROM HEP            Wrist UD/RD AROM HEP            Supination/ pronation AROM             R wrist extensor/elbow ext stretch HEP            Prolonged R elbow ext stretch supine HEP            R elbow AROM HEP                                      Ther Activity             Putty squeeze HEP            DigiGrip             Gait  Training                                       Modalities

## 2023-12-20 ENCOUNTER — OFFICE VISIT (OUTPATIENT)
Dept: PHYSICAL THERAPY | Facility: CLINIC | Age: 63
End: 2023-12-20
Payer: COMMERCIAL

## 2023-12-20 DIAGNOSIS — Z98.890 S/P CUBITAL TUNNEL RELEASE: ICD-10-CM

## 2023-12-20 DIAGNOSIS — Z98.890 S/P CARPAL TUNNEL RELEASE: Primary | ICD-10-CM

## 2023-12-20 DIAGNOSIS — M79.601 PAIN OF RIGHT ARM: ICD-10-CM

## 2023-12-20 PROCEDURE — 97110 THERAPEUTIC EXERCISES: CPT

## 2023-12-20 PROCEDURE — 97140 MANUAL THERAPY 1/> REGIONS: CPT

## 2023-12-20 NOTE — PROGRESS NOTES
"Daily Note     Today's date: 2023  Patient name: Eagle Nava  : 1960  MRN: [id number]  Referring provider: Ra Candelario DO  Dx:   Encounter Diagnosis     ICD-10-CM    1. S/P carpal tunnel release  Z98.890       2. S/P cubital tunnel release  Z98.890       3. Pain of right arm  M79.601           Start Time: 0745  Stop Time: 830  Total time in clinic (min): 45 minutes    Subjective: Reports continued stiffness in his R elbow and wrist. Reports he has done his HEP which went well.      Objective: See treatment diary below      Assessment: Tolerated treatment well. Patient demonstrated fatigue post treatment, exhibited good technique with therapeutic exercises, and would benefit from continued PT. Improved R elbow extension mobility and R wrist mobility noted following manuals with good tolerance noted. Educated about continuing scar massage at home to further improve mobility. Also educated to avoid guarding his arm while walking and to let it swing to further improve elbow stiffness.      Plan: Continue per plan of care.      Precautions: s/p R carpal tunnel release and ulnar nerve transposition on 11/15/23, 10lb lifting restriction        Manuals            R wrist/elbow PROM  MK           Scar mobilization  MK           R elbow/ FA STM  MK                        Neuro Re-Ed             R hand splay  5\"x10           R thumb opposition              Tendon gliding (tabletop to hook )  1x10 ea                                                               Ther Ex             Wrist flx/ext AROM HEP 3x10 ea           Wrist UD/RD AROM HEP            Supination/ pronation AROM             R wrist extensor/elbow ext stretch HEP 3x30\"           Prolonged R elbow ext stretch supine HEP            R elbow AROM HEP            R elbow ext AAROM cane  10x10\"                        Ther Activity             Putty squeeze HEP            DigiGrip  Grn 3x10           Gait Training                     "                   Modalities

## 2023-12-20 NOTE — PROGRESS NOTES
PT Evaluation     Today's date: 2023  Patient name: Eagle Nava  : 1960  MRN: [id number]  Referring provider: Ra Candelario DO  Dx:   No diagnosis found.                 Assessment  Assessment details: Patient is a 62 y/o male presenting to PT s/p R carpal tunnel release and ulnar nerve transposition on 11/15/23. Upon clinical exam, patient presents with decreased R wrist ROM, decreased R elbow ROM, decreased R wrist strength, decreased R elbow strength, decreased R  strength, and decreased scar mobility. These impairments limit the patient with gripping, lifting, moving his elbow, and moving his wrist. Exam findings and clinical signs and symptoms are consistent with post-op status. Patient will benefit from physical therapy to address the above impairments and maximize functional mobility.     Impairments: abnormal muscle tone, abnormal or restricted ROM, abnormal movement, activity intolerance, impaired physical strength, lacks appropriate home exercise program and pain with function    Symptom irritability: moderateUnderstanding of Dx/Px/POC: good   Prognosis: good    Goals  STG - to be met by week 4  1. Patient will be independent with HEP throughout therapy to prepare for eventual transition to maintenance program.  2. Patient will improve subjective pain to <=4/10 at worst to improve QOL.  3. Patient will improve R elbow extension AROM to 0 deg to perform ADLs with less difficulty.  4. Patient will improve R wrist AROM to WFL in all directions to perform ADLs with less difficulty.    LTG - to be met by discharge   1. Patient will improve R  strength to >=60 lbs to improve lifting ability.  2. Patient will improve RUE strength to >=4+/5 to improve functional mobility.  3. Patient will improve FOTO score to age matched norm to demonstrate functional improvements.  4. Patient will be able to perform yard work without difficulty to return to [facility].        Plan  Patient would benefit  "from: skilled physical therapy  Planned modality interventions: cryotherapy and thermotherapy: hydrocollator packs  Planned therapy interventions: activity modification, flexibility, functional ROM exercises, graded activity, graded exercise, home exercise program, IASTM, joint mobilization, manual therapy, neuromuscular re-education, patient education, strengthening, stretching, therapeutic activities and therapeutic exercise  Frequency: 2x week  Duration in weeks: 10  Treatment plan discussed with: patient        Subjective Evaluation    History of Present Illness  Date of surgery: 11/15/2023  Mechanism of injury: surgery  Mechanism of injury: Patient presents s/p R carpal tunnel release and ulnar nerve transposition on 11/15/23. Reports he was in a sling for 3 weeks post-op and notes stiffness and bruising from this in his R elbow. Reports he had the stitches removed on 23. Patient reports he has had chronic neck pain and (B) radicular symptoms from his elbows down to his hands for several years. Reports he had PT in the past which helped with his neck pain but did not change his lower arm radicular symptoms. Reports he had a carpal tunnel and cubital tunnel release performed in  which helped but then his symptoms returned. Patient reports difficulty with gripping, lifting, moving his elbow, and moving his wrist due to his pain and stiffness. Reports mild intermittent N/T in his R 3rd and 4th digits. Reports he has the same problem on his L side but not as severe. Reports Gabapentin helps with his pain. Reports he does not have an MD follow up scheduled.             Recurrent probem    Quality of life: good    Patient Goals  Patient goals for therapy: decreased pain, increased motion, increased strength and independence with ADLs/IADLs  Patient goal: \"get back to how I was\", yard work  Pain  Current pain ratin  At best pain ratin  At worst pain ratin  Location: R elbow and wrist  Quality: " tight  Aggravating factors: lifting  Progression: improved    Social Support    Employment status: not working  Hand dominance: right    Treatments  Previous treatment: medication and physical therapy  Current treatment: physical therapy        Objective     Observations     Additional Observation Details  R wrist incision clean, dry, intact  R elbow incision clean, dry, intact with ecchymosis in his R medial forearm and upper arm    Palpation     Additional Palpation Details  TTP R triceps, carpal tunnel, wrist flexor tendons    Neurological Testing     Additional Neurological Details  BUE sensation intact and equal to light touch     Active Range of Motion     Left Elbow   Flexion: 135 degrees   Extension: 0 degrees   Forearm supination: WFL  Forearm pronation: WFL    Right Elbow   Flexion: 120 degrees with pain  Extension: -30 degrees with pain  Forearm supination: WFL  Forearm pronation: WFL and with pain    Additional Active Range of Motion Details  R Wrist AROM:   Flexion: 40 deg with pain  Extension: 30 deg with pain  Ulnar deviation: 10 deg  Radial deviation: 15 deg    L Wrist AROM: WFL no pain all directions        Strength/Myotome Testing     Left Elbow   Flexion: 5  Extension: 5    Right Elbow   Flexion: 4-  Extension: 4-    Additional Strength Details  L wrist strength:   Flexion: 5/5  Extension: 5/5  Ulnar deviation: 5/5  Radial deviation: 5/5    R wrist strength:   Flexion: 4-/5 with pain  Extension: 4-/5  Ulnar deviation: 4-/5  Radial deviation: 4-/5    General Comments:      Elbow Comments   L  strength: 100lb  R  strength: 35lb               Precautions: s/p R carpal tunnel release and ulnar nerve transposition on 11/15/23, 10lb lifting restriction    Education: use of ice, HEP, scar mobilization      Manuals 12/18            R wrist/elbow PROM             Scar mobilization                                       Neuro Re-Ed             R hand splay             R thumb opposition               Tendon gliding (tabletop to hook )                                                                 Ther Ex             Wrist flx/ext AROM HEP            Wrist UD/RD AROM HEP            Supination/ pronation AROM             R wrist extensor/elbow ext stretch HEP            Prolonged R elbow ext stretch supine HEP            R elbow AROM HEP                                      Ther Activity             Putty squeeze HEP            DigiGrip             Gait Training                                       Modalities

## 2023-12-26 ENCOUNTER — APPOINTMENT (OUTPATIENT)
Dept: PHYSICAL THERAPY | Facility: CLINIC | Age: 63
End: 2023-12-26
Payer: COMMERCIAL

## 2023-12-26 ENCOUNTER — OFFICE VISIT (OUTPATIENT)
Dept: PHYSICAL THERAPY | Facility: CLINIC | Age: 63
End: 2023-12-26
Payer: COMMERCIAL

## 2023-12-26 DIAGNOSIS — M79.601 PAIN OF RIGHT ARM: ICD-10-CM

## 2023-12-26 DIAGNOSIS — Z98.890 S/P CUBITAL TUNNEL RELEASE: ICD-10-CM

## 2023-12-26 DIAGNOSIS — Z98.890 S/P CARPAL TUNNEL RELEASE: Primary | ICD-10-CM

## 2023-12-26 PROCEDURE — 97110 THERAPEUTIC EXERCISES: CPT

## 2023-12-26 PROCEDURE — 97140 MANUAL THERAPY 1/> REGIONS: CPT

## 2023-12-26 NOTE — PROGRESS NOTES
"Daily Note     Today's date: 2023  Patient name: Eagle Nava  : 1960  MRN: [id number]  Referring provider: Ra Candelario DO  Dx:   Encounter Diagnosis     ICD-10-CM    1. S/P carpal tunnel release  Z98.890       2. S/P cubital tunnel release  Z98.890       3. Pain of right arm  M79.601                      Subjective: Reports continued stiffness in his R elbow feeling looser in his wrist.Notes that he has been complaint with his HEP.       Objective: See treatment diary below      Assessment: Tolerated treatment well. Patient demonstrated fatigue post treatment, exhibited good technique with therapeutic exercises, and would benefit from continued PT. Pt continues to respond well to manuals this session with improved movements. Some cuing was needed throughout session to ensure proper form was performed.       Plan: Continue per plan of care.      Precautions: s/p R carpal tunnel release and ulnar nerve transposition on 11/15/23, 10lb lifting restriction        Manuals           R wrist/elbow PROM  MK MM          Scar mobilization  MK MM          R elbow/ FA STM  MK MM                       Neuro Re-Ed             R hand splay  5\"x10 5\"x10          R thumb opposition              Tendon gliding (tabletop to hook )  1x10 ea 1x10 ea                                                              Ther Ex             Wrist flx/ext AROM HEP 3x10 ea 3x10 ea          Wrist UD/RD AROM HEP  3x10 ea          Supination/ pronation AROM             R wrist extensor/elbow ext stretch HEP 3x30\" 3x30\"          Prolonged R elbow ext stretch supine HEP            R elbow AROM HEP            R elbow ext AAROM cane  10x10\" 10x10\"                       Ther Activity             Putty squeeze HEP            DigiGrip  Grn 3x10 Grn 3x10          Gait Training                                       Modalities                                            "

## 2023-12-28 ENCOUNTER — OFFICE VISIT (OUTPATIENT)
Dept: PHYSICAL THERAPY | Facility: CLINIC | Age: 63
End: 2023-12-28
Payer: COMMERCIAL

## 2023-12-28 DIAGNOSIS — Z98.890 S/P CUBITAL TUNNEL RELEASE: ICD-10-CM

## 2023-12-28 DIAGNOSIS — Z98.890 S/P CARPAL TUNNEL RELEASE: Primary | ICD-10-CM

## 2023-12-28 DIAGNOSIS — M79.601 PAIN OF RIGHT ARM: ICD-10-CM

## 2023-12-28 PROCEDURE — 97140 MANUAL THERAPY 1/> REGIONS: CPT

## 2023-12-28 PROCEDURE — 97110 THERAPEUTIC EXERCISES: CPT

## 2023-12-28 PROCEDURE — 97530 THERAPEUTIC ACTIVITIES: CPT

## 2023-12-28 NOTE — PROGRESS NOTES
"Daily Note     Today's date: 2023  Patient name: Eagle Nava  : 1960  MRN: [id number]  Referring provider: Ra Candelario DO  Dx:   Encounter Diagnosis     ICD-10-CM    1. S/P carpal tunnel release  Z98.890       2. Pain of right arm  M79.601       3. S/P cubital tunnel release  Z98.890             Start Time: 1010  Stop Time: 1050  Total time in clinic (min): 40 minutes    Subjective: Reports his elbow was feeling better after stretching yesterday but noted increased stiffness again this morning. Notes gripping is getting easier as well.      Objective: See treatment diary below      Assessment: Tolerated treatment well. Patient demonstrated fatigue post treatment, exhibited good technique with therapeutic exercises, and would benefit from continued PT. Improved R elbow and wrist mobility noted following STM and stretching. Most restrictions noted in his R medial bicep musculature. Educated to stretch his elbow in the mornings and in the evenings to minimize stiffness as these are the times he notes most stiffness. Also educated about continued scar mobilization and STM to break up scar tissue in his elbow and wrist.      Plan: Continue per plan of care.      Precautions: s/p R carpal tunnel release and ulnar nerve transposition on 11/15/23, 10lb lifting restriction        Manuals          R wrist/elbow PROM  MK MM MK         Scar mobilization  MK MM MK         R elbow/ FA STM  MK MM MK                      Neuro Re-Ed             R hand splay  5\"x10 5\"x10 5\"x10         R thumb opposition              Tendon gliding (tabletop to hook )  1x10 ea 1x10 ea 1x15                                                             Ther Ex             Wrist flx/ext AROM HEP 3x10 ea 3x10 ea 1lb 2x10 ea         Wrist UD/RD AROM HEP  3x10 ea 1lb 2x10 ea         Supination/ pronation AROM             R wrist extensor/elbow ext stretch HEP 3x30\" 3x30\" 3x30\"         Prolonged R elbow ext " "stretch supine HEP            R elbow AROM HEP            R elbow ext AAROM cane  10x10\" 10x10\" 10x10\" supine                      Ther Activity             Putty squeeze HEP            DigiGrip  Grn 3x10 Grn 3x10 Blue 3x10         [place] carry    2lb 1 lap         Gait Training                                       Modalities                                            "

## 2024-01-02 ENCOUNTER — OFFICE VISIT (OUTPATIENT)
Dept: PHYSICAL THERAPY | Facility: CLINIC | Age: 64
End: 2024-01-02
Payer: COMMERCIAL

## 2024-01-02 DIAGNOSIS — Z98.890 S/P CARPAL TUNNEL RELEASE: Primary | ICD-10-CM

## 2024-01-02 DIAGNOSIS — M79.601 PAIN OF RIGHT ARM: ICD-10-CM

## 2024-01-02 DIAGNOSIS — Z98.890 S/P CUBITAL TUNNEL RELEASE: ICD-10-CM

## 2024-01-02 PROCEDURE — 97110 THERAPEUTIC EXERCISES: CPT

## 2024-01-02 PROCEDURE — 97140 MANUAL THERAPY 1/> REGIONS: CPT

## 2024-01-02 PROCEDURE — 97530 THERAPEUTIC ACTIVITIES: CPT

## 2024-01-02 NOTE — PROGRESS NOTES
"Daily Note     Today's date: 2024  Patient name: Eagle Nava  : 1960  MRN: [id number]  Referring provider: Ra Candelario DO  Dx:   Encounter Diagnosis     ICD-10-CM    1. S/P carpal tunnel release  Z98.890       2. Pain of right arm  M79.601       3. S/P cubital tunnel release  Z98.890               Start Time: 1015  Stop Time: 1100  Total time in clinic (min): 45 minutes    Subjective: Reports his elbow mobility is getting better but still gets \"stiff\" when not stretching. Reports he had some R forearm and hand pain and swelling over the weekend when standing with his arm hanging for a longer period of time, but this resolved.      Objective: See treatment diary below      Assessment: Tolerated treatment well. Patient demonstrated fatigue post treatment, exhibited good technique with therapeutic exercises, and would benefit from continued PT. Educated patient to use ice if swelling is noted with prolonged activity. Improving R elbow and wrist mobility noted however remains most limited with elbow extension ROM due to continued muscular tightness. Wrist ext stretch performed with elbow bent to focus on wrist extension ROM.      Plan: Continue per plan of care.      Precautions: s/p R carpal tunnel release and ulnar nerve transposition on 11/15/23, 10lb lifting restriction        Manuals         R wrist/elbow PROM  MK MM MK MK        Scar mobilization  MK MM MK MK        R elbow/ FA STM  MK MM MK MK                     Neuro Re-Ed             R hand splay  5\"x10 5\"x10 5\"x10         R thumb opposition              Tendon gliding (tabletop to hook )  1x10 ea 1x10 ea 1x15 1x15                                                            Ther Ex             Wrist flx/ext AROM HEP 3x10 ea 3x10 ea 1lb 2x10 ea 2lb 2x10 ea        Wrist UD/RD AROM HEP  3x10 ea 1lb 2x10 ea 2lb 2x10 ea        Bent over tricep ext     2x10        R wrist extensor/elbow ext stretch HEP 3x30\" 3x30\" " "3x30\" 5x10\" elbow bent        Prolonged R elbow ext stretch supine HEP            R elbow AROM HEP            R elbow ext AAROM cane  10x10\" 10x10\" 10x10\" supine 10x10\" supine                     Ther Activity             Putty squeeze HEP            DigiGrip  Grn 3x10 Grn 3x10 Blue 3x10 Blue 3x12        [place] carry    2lb 1 lap         Gait Training                                       Modalities                                            "

## 2024-01-04 ENCOUNTER — OFFICE VISIT (OUTPATIENT)
Dept: PHYSICAL THERAPY | Facility: CLINIC | Age: 64
End: 2024-01-04
Payer: COMMERCIAL

## 2024-01-04 DIAGNOSIS — Z98.890 S/P CUBITAL TUNNEL RELEASE: ICD-10-CM

## 2024-01-04 DIAGNOSIS — M79.601 PAIN OF RIGHT ARM: ICD-10-CM

## 2024-01-04 DIAGNOSIS — Z98.890 S/P CARPAL TUNNEL RELEASE: Primary | ICD-10-CM

## 2024-01-04 PROCEDURE — 97110 THERAPEUTIC EXERCISES: CPT

## 2024-01-04 PROCEDURE — 97140 MANUAL THERAPY 1/> REGIONS: CPT

## 2024-01-04 PROCEDURE — 97530 THERAPEUTIC ACTIVITIES: CPT

## 2024-01-04 NOTE — PROGRESS NOTES
"Daily Note     Today's date: 2024  Patient name: Eagle Nava  : 1960  MRN: [id number]  Referring provider: Ra Candelario DO  Dx:   Encounter Diagnosis     ICD-10-CM    1. S/P carpal tunnel release  Z98.890       2. Pain of right arm  M79.601       3. S/P cubital tunnel release  Z98.890                 Start Time: 1015  Stop Time: 1100  Total time in clinic (min): 45 minutes    Subjective: Reports his elbow mobility is getting better but still gets \"stiff\" when not stretching. Reports he had some R forearm and hand pain and swelling over the weekend when standing with his arm hanging for a longer period of time, but this resolved.      Objective: See treatment diary below      Assessment: Tolerated treatment well. Patient demonstrated fatigue post treatment, exhibited good technique with therapeutic exercises, and would benefit from continued PT. Educated about focusing scar massage on his R elbow scar as this seems to be causing most tightness in his elbow with good understanding noted. Also educated about performing elbow AROM during prolonged standing to avoid stiffness. Focused on R elbow STM and stretching to improve R elbow flexibility with improved mobility noted post-session.      Plan: Continue per plan of care.      Precautions: s/p R carpal tunnel release and ulnar nerve transposition on 11/15/23, 10lb lifting restriction        Manuals  1       R wrist/elbow PROM  MK MM MK MK MK       Scar mobilization  MK MM MK MK MK       R elbow/ FA STM  MK MM MK MK MK                    Neuro Re-Ed             R hand splay  5\"x10 5\"x10 5\"x10         R thumb opposition              Tendon gliding (tabletop to hook )  1x10 ea 1x10 ea 1x15 1x15                                                            Ther Ex             Wrist flx/ext AROM HEP 3x10 ea 3x10 ea 1lb 2x10 ea 2lb 2x10 ea        Wrist UD/RD AROM HEP  3x10 ea 1lb 2x10 ea 2lb 2x10 ea        Bent over tricep ext  " "   2x10 5\" hold 2x10       R wrist extensor/elbow ext stretch HEP 3x30\" 3x30\" 3x30\" 5x10\" elbow bent 10x10\"       Prolonged R elbow ext stretch supine HEP            R elbow AROM HEP            R elbow ext AAROM cane  10x10\" 10x10\" 10x10\" supine 10x10\" supine 10x10\" supine       R elbow flexion stretch with self OP      10x10\"       Ther Activity             Putty squeeze HEP            DigiGrip  Grn 3x10 Grn 3x10 Blue 3x10 Blue 3x12 Blk 3x10       [place] carry    2lb 1 lap         Gait Training                                       Modalities                                            "

## 2024-01-09 ENCOUNTER — OFFICE VISIT (OUTPATIENT)
Dept: PHYSICAL THERAPY | Facility: CLINIC | Age: 64
End: 2024-01-09
Payer: COMMERCIAL

## 2024-01-09 DIAGNOSIS — Z98.890 S/P CARPAL TUNNEL RELEASE: Primary | ICD-10-CM

## 2024-01-09 DIAGNOSIS — Z98.890 S/P CUBITAL TUNNEL RELEASE: ICD-10-CM

## 2024-01-09 DIAGNOSIS — M79.601 PAIN OF RIGHT ARM: ICD-10-CM

## 2024-01-09 PROCEDURE — 97110 THERAPEUTIC EXERCISES: CPT

## 2024-01-09 PROCEDURE — 97140 MANUAL THERAPY 1/> REGIONS: CPT

## 2024-01-09 NOTE — PROGRESS NOTES
"Daily Note     Today's date: 2024  Patient name: Eagle Nava  : 1960  MRN: [id number]  Referring provider: Ra Candelario DO  Dx:   Encounter Diagnosis     ICD-10-CM    1. S/P carpal tunnel release  Z98.890       2. Pain of right arm  M79.601       3. S/P cubital tunnel release  Z98.890                   Start Time: 1100  Stop Time: 1140  Total time in clinic (min): 40 minutes    Subjective: Reports his elbow mobility is slowly improving but still notes stiffness after periods of inactivity especially first thing in the morning.      Objective: See treatment diary below      Assessment: Tolerated treatment well. Patient demonstrated fatigue post treatment, exhibited good technique with therapeutic exercises, and would benefit from continued PT. Continuing to demonstrate improvements in R elbow flexion and extension mobility with further improvements noted following STM and stretching. Most restrictions noted at his R posteromedial elbow musculature and scar.       Plan: Continue per plan of care.      Precautions: s/p R carpal tunnel release and ulnar nerve transposition on 11/15/23, 10lb lifting restriction        Manuals       R wrist/elbow PROM  MK MM MK MK MK MK      Scar mobilization  MK MM MK MK MK MK      R elbow/ FA STM  MK MM MK MK MK MK                   Neuro Re-Ed             R hand splay  5\"x10 5\"x10 5\"x10         R thumb opposition              Tendon gliding (tabletop to hook )  1x10 ea 1x10 ea 1x15 1x15                                                            Ther Ex             Wrist flx/ext AROM HEP 3x10 ea 3x10 ea 1lb 2x10 ea 2lb 2x10 ea        Wrist UD/RD AROM HEP  3x10 ea 1lb 2x10 ea 2lb 2x10 ea        Bent over tricep ext     2x10 5\" hold 2x10 5\" hold 2x10      R wrist extensor/elbow ext stretch HEP 3x30\" 3x30\" 3x30\" 5x10\" elbow bent 10x10\" 10x10\"      Prolonged R elbow ext stretch supine HEP            R elbow AROM HEP            R elbow " "ext AAROM cane  10x10\" 10x10\" 10x10\" supine 10x10\" supine 10x10\" supine 3x30\" supine      R elbow flexion stretch with self OP      10x10\" 3x30\"      Ther Activity             Putty squeeze HEP            DigiGrip  Grn 3x10 Grn 3x10 Blue 3x10 Blue 3x12 Blk 3x10       [place] carry    2lb 1 lap         Gait Training                                       Modalities                                            "

## 2024-01-11 ENCOUNTER — OFFICE VISIT (OUTPATIENT)
Dept: PHYSICAL THERAPY | Facility: CLINIC | Age: 64
End: 2024-01-11
Payer: COMMERCIAL

## 2024-01-11 DIAGNOSIS — M79.601 PAIN OF RIGHT ARM: ICD-10-CM

## 2024-01-11 DIAGNOSIS — Z98.890 S/P CARPAL TUNNEL RELEASE: Primary | ICD-10-CM

## 2024-01-11 DIAGNOSIS — Z98.890 S/P CUBITAL TUNNEL RELEASE: ICD-10-CM

## 2024-01-11 PROCEDURE — 97110 THERAPEUTIC EXERCISES: CPT

## 2024-01-11 PROCEDURE — 97140 MANUAL THERAPY 1/> REGIONS: CPT

## 2024-01-11 NOTE — PROGRESS NOTES
"Daily Note     Today's date: 2024  Patient name: Eagle Nava  : 1960  MRN: [id number]  Referring provider: Ra Candelario DO  Dx:   Encounter Diagnosis     ICD-10-CM    1. S/P carpal tunnel release  Z98.890       2. Pain of right arm  M79.601       3. S/P cubital tunnel release  Z98.890             Start Time: 1100  Stop Time: 1145  Total time in clinic (min): 45 minutes    Subjective: Reports continued R elbow stiffness that is worse in the mornings and after inactivity. Reports HEP is going well.       Objective: See treatment diary below      Assessment: Tolerated treatment well. Patient demonstrated fatigue post treatment, exhibited good technique with therapeutic exercises, and would benefit from continued PT. Good tolerance to addition of cupping with improved R elbow mobility noted following. Continues to be limited in R elbow extension ROM but flexion is WFL. Most restrictions in his R posteromedial elbow musculature.      Plan: Continue per plan of care.      Precautions: s/p R carpal tunnel release and ulnar nerve transposition on 11/15/23, 10lb lifting restriction        Manuals      R wrist/elbow PROM  MK MM MK MK MK MK MK     Scar mobilization  MK MM MK MK MK MK MK     R elbow/ FA STM  MK MM MK MK MK MK MK     Cupping R elbow        MK     Neuro Re-Ed             R hand splay  5\"x10 5\"x10 5\"x10         R thumb opposition              Tendon gliding (tabletop to hook )  1x10 ea 1x10 ea 1x15 1x15                                                            Ther Ex             Wrist flx/ext AROM HEP 3x10 ea 3x10 ea 1lb 2x10 ea 2lb 2x10 ea        Wrist UD/RD AROM HEP  3x10 ea 1lb 2x10 ea 2lb 2x10 ea        Bicep curl        3lb 2x10     Bent over tricep ext     2x10 5\" hold 2x10 5\" hold 2x10 3lb 5\"x20     R wrist extensor/elbow ext stretch HEP 3x30\" 3x30\" 3x30\" 5x10\" elbow bent 10x10\" 10x10\" 10x10\"     Prolonged R elbow ext stretch supine HEP        " "    R elbow AROM HEP            R elbow ext AAROM cane  10x10\" 10x10\" 10x10\" supine 10x10\" supine 10x10\" supine 3x30\" supine 5x30\" seated     R elbow flexion stretch with self OP      10x10\" 3x30\" 10\"x20     Ther Activity             Putty squeeze HEP            DigiGrip  Grn 3x10 Grn 3x10 Blue 3x10 Blue 3x12 Blk 3x10       [place] carry    2lb 1 lap         Gait Training                                       Modalities                                            "

## 2024-01-15 ENCOUNTER — OFFICE VISIT (OUTPATIENT)
Dept: PHYSICAL THERAPY | Facility: CLINIC | Age: 64
End: 2024-01-15
Payer: COMMERCIAL

## 2024-01-15 DIAGNOSIS — M79.601 PAIN OF RIGHT ARM: ICD-10-CM

## 2024-01-15 DIAGNOSIS — Z98.890 S/P CARPAL TUNNEL RELEASE: Primary | ICD-10-CM

## 2024-01-15 DIAGNOSIS — Z98.890 S/P CUBITAL TUNNEL RELEASE: ICD-10-CM

## 2024-01-15 PROCEDURE — 97140 MANUAL THERAPY 1/> REGIONS: CPT

## 2024-01-15 PROCEDURE — 97110 THERAPEUTIC EXERCISES: CPT

## 2024-01-15 NOTE — PROGRESS NOTES
"Daily Note     Today's date: 1/15/2024  Patient name: Eagle Nava  : 1960  MRN: [id number]  Referring provider: Ra Candelario DO  Dx:   Encounter Diagnosis     ICD-10-CM    1. S/P carpal tunnel release  Z98.890       2. Pain of right arm  M79.601       3. S/P cubital tunnel release  Z98.890               Start Time: 1445  Stop Time: 1530  Total time in clinic (min): 45 minutes    Subjective: Reports his elbow felt looser and a lot better for 2 days following cupping. Reports he reached for something in his passenger seat and felt a twinge in his medial elbow with pain that lasted for approximately 5 minutes then went away.        Objective: See treatment diary below      Assessment: Tolerated treatment well. Patient demonstrated fatigue post treatment, exhibited good technique with therapeutic exercises, and would benefit from continued PT. Continued with cupping due to good results following last session with decreased muscle restrictions and improved mobility noted following. Also some continued tightness into R wrist extension with improvements noted following STM and PROM.      Plan: Continue per plan of care.      Precautions: s/p R carpal tunnel release and ulnar nerve transposition on 11/15/23, 10lb lifting restriction        Manuals 12/18 12/20 12/26 12/28 1/2 1/4 1/9 1/11 1/15    R wrist/elbow PROM  MK MM MK MK MK MK MK MK    Scar mobilization  MK MM MK MK MK MK MK MK    R elbow/ FA STM  MK MM MK MK MK MK MK MK    Cupping R elbow        MK MK    Neuro Re-Ed             R hand splay  5\"x10 5\"x10 5\"x10         R thumb opposition              Tendon gliding (tabletop to hook )  1x10 ea 1x10 ea 1x15 1x15                                                            Ther Ex             Wrist flx/ext AROM HEP 3x10 ea 3x10 ea 1lb 2x10 ea 2lb 2x10 ea        Wrist UD/RD AROM HEP  3x10 ea 1lb 2x10 ea 2lb 2x10 ea        Bicep curl        3lb 2x10 3lb 2x10    Bent over tricep ext     2x10 5\" hold 2x10 5\" " "hold 2x10 3lb 5\"x20 3lb 5\"x20    R wrist extensor/elbow ext stretch HEP 3x30\" 3x30\" 3x30\" 5x10\" elbow bent 10x10\" 10x10\" 10x10\" 10x10\"    Prolonged R elbow ext stretch supine HEP            R elbow AROM HEP            R elbow ext AAROM cane  10x10\" 10x10\" 10x10\" supine 10x10\" supine 10x10\" supine 3x30\" supine 5x30\" seated 5x30\" seated    R elbow flexion stretch with self OP      10x10\" 3x30\" 10\"x20 10x10\"    Ther Activity             Putty squeeze HEP            DigiGrip  Grn 3x10 Grn 3x10 Blue 3x10 Blue 3x12 Blk 3x10       [place] carry    2lb 1 lap         Gait Training                                       Modalities                                            "

## 2024-01-18 ENCOUNTER — APPOINTMENT (OUTPATIENT)
Dept: PHYSICAL THERAPY | Facility: CLINIC | Age: 64
End: 2024-01-18
Payer: COMMERCIAL

## 2024-01-19 ENCOUNTER — OFFICE VISIT (OUTPATIENT)
Dept: PHYSICAL THERAPY | Facility: CLINIC | Age: 64
End: 2024-01-19
Payer: COMMERCIAL

## 2024-01-19 DIAGNOSIS — Z98.890 S/P CUBITAL TUNNEL RELEASE: ICD-10-CM

## 2024-01-19 DIAGNOSIS — Z98.890 S/P CARPAL TUNNEL RELEASE: Primary | ICD-10-CM

## 2024-01-19 DIAGNOSIS — M79.601 PAIN OF RIGHT ARM: ICD-10-CM

## 2024-01-19 PROCEDURE — 97140 MANUAL THERAPY 1/> REGIONS: CPT

## 2024-01-19 PROCEDURE — 97110 THERAPEUTIC EXERCISES: CPT

## 2024-01-19 NOTE — PROGRESS NOTES
"Daily Note     Today's date: 2024  Patient name: Eagle Nava  : 1960  MRN: [id number]  Referring provider: Ra Candelario DO  Dx:   Encounter Diagnosis     ICD-10-CM    1. S/P carpal tunnel release  Z98.890       2. Pain of right arm  M79.601       3. S/P cubital tunnel release  Z98.890                 Start Time: 0830  Stop Time: 0915  Total time in clinic (min): 45 minutes    Subjective: Reports his elbow is getting less stiff and feels better after PT and stretching but continues to stiffen up. Reports he has an MD follow up 24.      Objective: See treatment diary below     strength:  LUE: 90 lbs  RUE: 50 lbs    Assessment: Tolerated treatment well. Patient demonstrated fatigue post treatment, exhibited good technique with therapeutic exercises, and would benefit from continued PT. Continued relief and improved mobility noted following STM and cupping. Improved R  strength noted this session.      Plan: Continue per plan of care.      Precautions: s/p R carpal tunnel release and ulnar nerve transposition on 11/15/23, 10lb lifting restriction        Manuals 12/18 12/20 12/26 12/28 1/2 1/4 1/9 1/11 1/15 1/19   R wrist/elbow PROM  MK MM MK MK MK MK MK MK MK   Scar mobilization  MK MM MK MK MK MK MK MK    R elbow/ FA STM  MK MM MK MK MK MK MK MK MK   Cupping R elbow        MK MK MK   Neuro Re-Ed             R hand splay  5\"x10 5\"x10 5\"x10         R thumb opposition              Tendon gliding (tabletop to hook )  1x10 ea 1x10 ea 1x15 1x15                                                            Ther Ex             Wrist flx/ext AROM HEP 3x10 ea 3x10 ea 1lb 2x10 ea 2lb 2x10 ea        Wrist UD/RD AROM HEP  3x10 ea 1lb 2x10 ea 2lb 2x10 ea        Bicep curl        3lb 2x10 3lb 2x10    Bent over tricep ext     2x10 5\" hold 2x10 5\" hold 2x10 3lb 5\"x20 3lb 5\"x20    R wrist extensor/elbow ext stretch HEP 3x30\" 3x30\" 3x30\" 5x10\" elbow bent 10x10\" 10x10\" 10x10\" 10x10\"    Prolonged R elbow ext " "stretch supine HEP            R elbow AROM HEP            R elbow ext AAROM cane  10x10\" 10x10\" 10x10\" supine 10x10\" supine 10x10\" supine 3x30\" supine 5x30\" seated 5x30\" seated 3 min 2lb weight   R elbow flexion stretch with self OP      10x10\" 3x30\" 10\"x20 10x10\"    Ther Activity             Putty squeeze HEP            DigiGrip  Grn 3x10 Grn 3x10 Blue 3x10 Blue 3x12 Blk 3x10       [place] carry    2lb 1 lap         Gait Training                                       Modalities                                            "

## 2024-01-25 ENCOUNTER — OFFICE VISIT (OUTPATIENT)
Dept: PHYSICAL THERAPY | Facility: CLINIC | Age: 64
End: 2024-01-25
Payer: COMMERCIAL

## 2024-01-25 DIAGNOSIS — Z98.890 S/P CARPAL TUNNEL RELEASE: Primary | ICD-10-CM

## 2024-01-25 DIAGNOSIS — M79.601 PAIN OF RIGHT ARM: ICD-10-CM

## 2024-01-25 DIAGNOSIS — Z98.890 S/P CUBITAL TUNNEL RELEASE: ICD-10-CM

## 2024-01-25 PROCEDURE — 97110 THERAPEUTIC EXERCISES: CPT

## 2024-01-25 PROCEDURE — 97140 MANUAL THERAPY 1/> REGIONS: CPT

## 2024-01-25 NOTE — PROGRESS NOTES
"Daily Note     Today's date: 2024  Patient name: Eagle Nava  : 1960  MRN: [id number]  Referring provider: Ra Candelario DO  Dx:   Encounter Diagnosis     ICD-10-CM    1. S/P carpal tunnel release  Z98.890       2. Pain of right arm  M79.601       3. S/P cubital tunnel release  Z98.890                   Start Time: 1445  Stop Time: 1530  Total time in clinic (min): 45 minutes    Subjective: Reports his elbow mobility and pain levels are improving. Reports he has been able to use a screwdriver and do housework without pain or difficulty. Reports his pain has been a 4/10 at worst in his R medial elbow and wrist and only occurs when doing certain motions. Reports he has an MD follow up 24.      Objective: See treatment diary below    R elbow AROM:   Flexion: 130 deg  Extension: lacking 15 deg      Assessment: Tolerated treatment well. Patient demonstrated fatigue post treatment, exhibited good technique with therapeutic exercises, and would benefit from continued PT. Continuing to progress R elbow mobility overall with focus on extension ROM.      Plan: Continue per plan of care.      Precautions: s/p R carpal tunnel release and ulnar nerve transposition on 11/15/23, 10lb lifting restriction        Manuals 12/18 12/20 12/26 12/28 1/2 1/4 1/9 1/11 1/15 1/19 1/25   R wrist/elbow PROM  MK MM MK MK MK MK MK MK MK MK   Scar mobilization  MK MM MK MK MK MK MK MK  MK   R elbow/ FA STM  MK MM MK MK MK MK MK MK MK MK   Cupping R elbow        MK MK MK MK   Neuro Re-Ed              R hand splay  5\"x10 5\"x10 5\"x10          R thumb opposition               Tendon gliding (tabletop to hook )  1x10 ea 1x10 ea 1x15 1x15                                                                 Ther Ex              Wrist flx/ext AROM HEP 3x10 ea 3x10 ea 1lb 2x10 ea 2lb 2x10 ea         Wrist UD/RD AROM HEP  3x10 ea 1lb 2x10 ea 2lb 2x10 ea         Bicep curl        3lb 2x10 3lb 2x10     Bent over tricep ext     2x10 5\" " "hold 2x10 5\" hold 2x10 3lb 5\"x20 3lb 5\"x20     R wrist extensor/elbow ext stretch HEP 3x30\" 3x30\" 3x30\" 5x10\" elbow bent 10x10\" 10x10\" 10x10\" 10x10\"  10x10\"   Prolonged R elbow ext stretch supine HEP             R elbow AROM HEP             R elbow ext AAROM cane  10x10\" 10x10\" 10x10\" supine 10x10\" supine 10x10\" supine 3x30\" supine 5x30\" seated 5x30\" seated 3 min 2lb weight 2 min 3lb weight   R elbow flexion stretch with self OP      10x10\" 3x30\" 10\"x20 10x10\"     Ther Activity              Putty squeeze HEP             DigiGrip  Grn 3x10 Grn 3x10 Blue 3x10 Blue 3x12 Blk 3x10     Blk 3x10   [place] carry    2lb 1 lap          Gait Training                                          Modalities                                               "

## 2024-01-29 ENCOUNTER — OFFICE VISIT (OUTPATIENT)
Dept: PHYSICAL THERAPY | Facility: CLINIC | Age: 64
End: 2024-01-29
Payer: COMMERCIAL

## 2024-01-29 DIAGNOSIS — M79.601 PAIN OF RIGHT ARM: ICD-10-CM

## 2024-01-29 DIAGNOSIS — Z98.890 S/P CUBITAL TUNNEL RELEASE: ICD-10-CM

## 2024-01-29 DIAGNOSIS — Z98.890 S/P CARPAL TUNNEL RELEASE: Primary | ICD-10-CM

## 2024-01-29 PROCEDURE — 97140 MANUAL THERAPY 1/> REGIONS: CPT | Performed by: PHYSICAL THERAPIST

## 2024-01-29 PROCEDURE — 97110 THERAPEUTIC EXERCISES: CPT | Performed by: PHYSICAL THERAPIST

## 2024-01-29 NOTE — PROGRESS NOTES
"Daily Note     Today's date: 2024  Patient name: Eagle Nava  : 1960  MRN: [id number]  Referring provider: Ra Candelario DO  Dx:   Encounter Diagnosis     ICD-10-CM    1. S/P carpal tunnel release  Z98.890       2. Pain of right arm  M79.601       3. S/P cubital tunnel release  Z98.890                              Subjective: Reports sees MD this Thursday. Hoping to get lifting restriction lifted or improved.    Objective: See treatment diary below    R elbow AROM:   Flexion: 130 deg  Extension: lacking 15 deg     strength:  R 85   L 105      Assessment: Tolerated treatment well. Patient demonstrated fatigue post treatment, exhibited good technique with therapeutic exercises, and would benefit from continued PT. Continuing to progress R elbow mobility overall with focus on extension ROM.      Plan: Continue per plan of care.      Precautions: s/p R carpal tunnel release and ulnar nerve transposition on 11/15/23, 10lb lifting restriction        Manuals 12/28 1/2 1/4 1/9 1/11 1/15 1/19 1/25 1/29   R wrist/elbow PROM MK MK MK MK MK MK MK MK MK with extension mobs    Scar mobilization MK MK MK MK MK MK  MK MK   R elbow/ FA STM MK MK MK MK MK MK MK MK MK   Cupping R elbow     MK MK MK MK MK   Neuro Re-Ed            R hand splay 5\"x10           R thumb opposition             Tendon gliding (tabletop to hook ) 1x15 1x15                                                          Ther Ex            Wrist flx/ext AROM 1lb 2x10 ea 2lb 2x10 ea          Wrist UD/RD AROM 1lb 2x10 ea 2lb 2x10 ea          Bicep curl     3lb 2x10 3lb 2x10      Bent over tricep ext  2x10 5\" hold 2x10 5\" hold 2x10 3lb 5\"x20 3lb 5\"x20      R wrist extensor/elbow ext stretch 3x30\" 5x10\" elbow bent 10x10\" 10x10\" 10x10\" 10x10\"  10x10\"    Prolonged R elbow ext stretch supine            CKC elbow extension stretches         5x 5\" holds 3 ways    R elbow ext AAROM cane 10x10\" supine 10x10\" supine 10x10\" supine 3x30\" supine 5x30\" " "seated 5x30\" seated 3 min 2lb weight 2 min 3lb weight -   R elbow flexion stretch with self OP   10x10\" 3x30\" 10\"x20 10x10\"      Ther Activity            Putty squeeze            DigiGrip Blue 3x10 Blue 3x12 Blk 3x10     Blk 3x10 3x10 black    [place] carry 2lb 1 lap           Gait Training                                    Modalities                                         "

## 2024-02-06 ENCOUNTER — OFFICE VISIT (OUTPATIENT)
Dept: PHYSICAL THERAPY | Facility: CLINIC | Age: 64
End: 2024-02-06
Payer: COMMERCIAL

## 2024-02-06 DIAGNOSIS — Z98.890 S/P CARPAL TUNNEL RELEASE: Primary | ICD-10-CM

## 2024-02-06 DIAGNOSIS — M79.601 PAIN OF RIGHT ARM: ICD-10-CM

## 2024-02-06 DIAGNOSIS — Z98.890 S/P CUBITAL TUNNEL RELEASE: ICD-10-CM

## 2024-02-06 PROCEDURE — 97110 THERAPEUTIC EXERCISES: CPT

## 2024-02-06 PROCEDURE — 97140 MANUAL THERAPY 1/> REGIONS: CPT

## 2024-02-06 NOTE — PROGRESS NOTES
"Daily Note     Today's date: 2024  Patient name: Eagle Nava  : 1960  MRN: [id number]  Referring provider: Ra Candelario DO  Dx:   Encounter Diagnosis     ICD-10-CM    1. S/P carpal tunnel release  Z98.890       2. Pain of right arm  M79.601       3. S/P cubital tunnel release  Z98.890             Start Time: 0800  Stop Time: 0845  Total time in clinic (min): 45 minutes    Subjective: Reports he saw the MD Thursday who said he is healing well but did not mention about changing his lifting restrictions.     Objective: See treatment diary below      Assessment: Tolerated treatment well. Patient demonstrated fatigue post treatment, exhibited good technique with therapeutic exercises, and would benefit from continued PT. Continuing to focus on improving R elbow extension mobility with most soft tissue restrictions noted at his R medial elbow musculature.       Plan: Continue per plan of care.      Precautions: s/p R carpal tunnel release and ulnar nerve transposition on 11/15/23, 10lb lifting restriction        Manuals 12/28 1/2 1/4 1/9 1/11 1/15 1/19 1/25 1/29 2/6   R wrist/elbow PROM MK MK MK MK MK MK MK MK MK with extension mobs  MK with extension mobs    Scar mobilization MK MK MK MK MK MK  MK MK MK   R elbow/ FA STM MK MK MK MK MK MK MK MK MK MK   Cupping R elbow     MK MK MK MK MK MK   Neuro Re-Ed             R hand splay 5\"x10            R thumb opposition              Tendon gliding (tabletop to hook ) 1x15 1x15                                                               Ther Ex             Wrist flx/ext AROM 1lb 2x10 ea 2lb 2x10 ea           Wrist UD/RD AROM 1lb 2x10 ea 2lb 2x10 ea           Bicep curl     3lb 2x10 3lb 2x10       Bent over tricep ext  2x10 5\" hold 2x10 5\" hold 2x10 3lb 5\"x20 3lb 5\"x20       R wrist extensor/elbow ext stretch 3x30\" 5x10\" elbow bent 10x10\" 10x10\" 10x10\" 10x10\"  10x10\"     Prolonged R elbow ext stretch supine             CKC elbow extension stretches         " "5x 5\" holds 3 ways  5x 5\" holds 4 ways    R elbow ext AAROM cane 10x10\" supine 10x10\" supine 10x10\" supine 3x30\" supine 5x30\" seated 5x30\" seated 3 min 2lb weight 2 min 3lb weight - 3 min 3lb weight   R elbow flexion stretch with self OP   10x10\" 3x30\" 10\"x20 10x10\"       Ther Activity             Putty squeeze             DigiGrip Blue 3x10 Blue 3x12 Blk 3x10     Blk 3x10 3x10 black  3x10 black    [place] carry 2lb 1 lap            Gait Training                                       Modalities                                            "

## 2024-02-13 ENCOUNTER — OFFICE VISIT (OUTPATIENT)
Dept: PHYSICAL THERAPY | Facility: CLINIC | Age: 64
End: 2024-02-13
Payer: COMMERCIAL

## 2024-02-13 DIAGNOSIS — Z98.890 S/P CUBITAL TUNNEL RELEASE: ICD-10-CM

## 2024-02-13 DIAGNOSIS — Z98.890 S/P CARPAL TUNNEL RELEASE: Primary | ICD-10-CM

## 2024-02-13 DIAGNOSIS — M79.601 PAIN OF RIGHT ARM: ICD-10-CM

## 2024-02-13 PROCEDURE — 97110 THERAPEUTIC EXERCISES: CPT

## 2024-02-13 PROCEDURE — 97530 THERAPEUTIC ACTIVITIES: CPT

## 2024-02-13 PROCEDURE — 97140 MANUAL THERAPY 1/> REGIONS: CPT

## 2024-02-13 NOTE — PROGRESS NOTES
"Daily Note     Today's date: 2024  Patient name: Eagle Nava  : 1960  MRN: [id number]  Referring provider: Ra Candelario DO  Dx:   Encounter Diagnosis     ICD-10-CM    1. S/P carpal tunnel release  Z98.890       2. Pain of right arm  M79.601       3. S/P cubital tunnel release  Z98.890               Start Time: 09  Stop Time: 1023  Total time in clinic (min): 53 minutes    Subjective: Reports his R arm is feeling better and more flexible overall. Reports he is able to use his arm more and has less stiffness in the mornings overall. Reports minimal intermittent pain in his R medial elbow musculature but it is improving overall.     Objective: See treatment diary below      Assessment: Tolerated treatment well. Patient demonstrated fatigue post treatment, exhibited good technique with therapeutic exercises, and would benefit from continued PT. Good improvements made overall in R elbow/wrist flexibility, strength, and function. Patient is demonstrating good improvements overall and is independent and compliant with his HEP.       Plan: Continue per plan of care. Anticipate discharge to HEP next visit due to good progress made and independence with his HEP.      Precautions: s/p R carpal tunnel release and ulnar nerve transposition on 11/15/23, 10lb lifting restriction        Manuals 12/28 1/2 1/4 1/9 1/11 1/15 1/19 1/25 1/29 2/6 2/13   R wrist/elbow PROM MK MK MK MK MK MK MK MK MK with extension mobs  MK with extension mobs  MK with extension mobs and distraction   Scar mobilization MK MK MK MK MK MK  MK MK MK MK   R elbow/ FA STM MK MK MK MK MK MK MK MK MK MK MK   Cupping R elbow     MK MK MK MK MK MK MK   Neuro Re-Ed              R hand splay 5\"x10             R thumb opposition               Tendon gliding (tabletop to hook ) 1x15 1x15                                                                    Ther Ex              Wrist flx/ext AROM 1lb 2x10 ea 2lb 2x10 ea            Wrist UD/RD AROM " "1lb 2x10 ea 2lb 2x10 ea            Bicep curl     3lb 2x10 3lb 2x10        Bent over tricep ext  2x10 5\" hold 2x10 5\" hold 2x10 3lb 5\"x20 3lb 5\"x20        R wrist extensor/elbow ext stretch 3x30\" 5x10\" elbow bent 10x10\" 10x10\" 10x10\" 10x10\"  10x10\"   10x10\"   Prolonged R elbow ext stretch supine              CKC elbow extension stretches         5x 5\" holds 3 ways  5x 5\" holds 4 ways  5x 10\" holds 3 ways   R elbow ext AAROM cane 10x10\" supine 10x10\" supine 10x10\" supine 3x30\" supine 5x30\" seated 5x30\" seated 3 min 2lb weight 2 min 3lb weight - 3 min 3lb weight 3 min 3lb weight   R elbow flexion stretch with self OP   10x10\" 3x30\" 10\"x20 10x10\"        Ther Activity              Putty squeeze              DigiGrip Blue 3x10 Blue 3x12 Blk 3x10     Blk 3x10 3x10 black  3x10 black  3x10 black    [place] carry 2lb 1 lap             Gait Training                                          Modalities                                               "

## 2024-02-22 ENCOUNTER — OFFICE VISIT (OUTPATIENT)
Dept: PHYSICAL THERAPY | Facility: CLINIC | Age: 64
End: 2024-02-22
Payer: COMMERCIAL

## 2024-02-22 DIAGNOSIS — Z98.890 S/P CUBITAL TUNNEL RELEASE: ICD-10-CM

## 2024-02-22 DIAGNOSIS — M79.601 PAIN OF RIGHT ARM: ICD-10-CM

## 2024-02-22 DIAGNOSIS — Z98.890 S/P CARPAL TUNNEL RELEASE: Primary | ICD-10-CM

## 2024-02-22 PROCEDURE — 97140 MANUAL THERAPY 1/> REGIONS: CPT

## 2024-02-22 PROCEDURE — 97530 THERAPEUTIC ACTIVITIES: CPT

## 2024-02-22 NOTE — LETTER
2024    Ra Candelario DO  [address] [facility] [address]    Patient: Eagle Nava   YOB: 1960   Date of Visit: 2024     Encounter Diagnosis     ICD-10-CM    1. S/P carpal tunnel release  Z98.890       2. Pain of right arm  M79.601       3. S/P cubital tunnel release  Z98.890           Dear Dr. Candelario:    Thank you for your recent referral of Eagle Nava. Please review the attached evaluation summary from Eagle's recent visit.     Please verify that you agree with the plan of care by signing the attached order.     If you have any questions or concerns, please do not hesitate to call.     I sincerely appreciate the opportunity to share in the care of one of your patients and hope to have another opportunity to work with you in the near future.       Sincerely,    Emily Ingram, PT      Referring Provider:      I certify that I have read the below Plan of Care and certify the need for these services furnished under this plan of treatment while under my care.                    Ra Candelario DO  [address] [facility] [address]  Via Fax: [phone]          PT Discharge     Today's date: 2024  Patient name: Eagle Nava  : 1960  MRN: [id number]  Referring provider: Ra Candelario DO  Dx:   Encounter Diagnosis     ICD-10-CM    1. S/P carpal tunnel release  Z98.890       2. Pain of right arm  M79.601       3. S/P cubital tunnel release  Z98.890                 Start Time: 0845  Stop Time: 930  Total time in clinic (min): 45 minutes    Subjective: Reports his R elbow and wrist are feeling better and more flexible overall. Reports he is able to use his arm more and has less stiffness in the mornings overall. Reports he also feels improvements in his RUE strength and  strength. Reports no difficulty with ADLs due to his RUE. Reports minimal intermittent pain in his R medial elbow musculature but it is improving and becoming less  frequent overall. Reports he is independent and compliant with his HEP and is agreeable to complete PT today and continue with his HEP to continue making improvements.     Objective: See treatment diary below    Goals  STG - to be met by week 4  1. Patient will be independent with HEP throughout therapy to prepare for eventual transition to maintenance program. - goal met  2. Patient will improve subjective pain to <=4/10 at worst to improve QOL. - goal met  3. Patient will improve R elbow extension AROM to 0 deg to perform ADLs with less difficulty. - not met   4. Patient will improve R wrist AROM to WFL in all directions to perform ADLs with less difficulty. - goal met     LTG - to be met by discharge   1. Patient will improve R  strength to >=60 lbs to improve lifting ability. - goal met  2. Patient will improve RUE strength to >=4+/5 to improve functional mobility. - goal met  3. Patient will improve FOTO score to age matched norm to demonstrate functional improvements. - goal met  4. Patient will be able to perform yard work without difficulty to return to [facility]. - goal met    Palpation      Additional Palpation Details  TTP R medial bicep    Active Range of Motion      Left Elbow   Flexion: 135 degrees   Extension: 0 degrees   Forearm supination: WFL  Forearm pronation: WFL     Right Elbow   Flexion: 135 deg / 120 degrees with pain  Extension: -5 deg /  -30 degrees with pain  Forearm supination: WFL / WFL  Forearm pronation: WFL no pain / WFL and with pain     Additional Active Range of Motion Details  R Wrist AROM:   Flexion: 50 deg / 40 deg with pain  Extension: 50 deg /  30 deg with pain  Ulnar deviation: 20 deg /  10 deg  Radial deviation: 15 deg /  15 deg     L Wrist AROM: WFL no pain all directions           Strength/Myotome Testing      Left Elbow   Flexion: 5  Extension: 5     Right Elbow   Flexion: 5 / 4-  Extension: 5 / 4-     Additional Strength Details  L wrist strength:   Flexion:  "5/5  Extension: 5/5  Ulnar deviation: 5/5  Radial deviation: 5/5     R wrist strength:   Flexion: 5/5 / 4-/5 with pain  Extension: 5/5 / 4-/5  Ulnar deviation: 5/5 / 4-/5  Radial deviation: 5/5 / 4-/5     General Comments:        Elbow Comments   L  strength: 105lb / 100lb  R  strength: 85lb / 35lb      Assessment: Patient presents to PT for discharge. Subjectively, patient reports he has made improvements in his pain levels, R elbow mobility, R wrist mobility, and RUE function since beginning PT. Objectively, patient has made improvements in R elbow AROM, R wrist AROM, and RUE strength. Patient has met a majority of his therapy goals and is independent with his updated HEP. Patient was discharged from physical therapy.        Plan: Patient was discharged from physical therapy.     Precautions: s/p R carpal tunnel release and ulnar nerve transposition on 11/15/23, 10lb lifting restriction        Manuals 12/28 1/2 1/4 1/9 1/11 1/15 1/19 1/25 1/29 2/6 2/13 2/22   R wrist/elbow PROM MK MK MK MK MK MK MK MK MK with extension mobs  MK with extension mobs  MK with extension mobs and distraction MK with extension mobs and distraction   Scar mobilization MK MK MK MK MK MK  MK MK MK MK MK   R elbow/ FA STM MK MK MK MK MK MK MK MK MK MK MK MK   Cupping R elbow     MK MK MK MK MK MK MK MK   Neuro Re-Ed               R hand splay 5\"x10              R thumb opposition                Tendon gliding (tabletop to hook ) 1x15 1x15             Contract relax tricep extension            3x10\"  HEP                                                Ther Ex               Wrist flx/ext AROM 1lb 2x10 ea 2lb 2x10 ea             Wrist UD/RD AROM 1lb 2x10 ea 2lb 2x10 ea             Bicep curl     3lb 2x10 3lb 2x10         Bent over tricep ext  2x10 5\" hold 2x10 5\" hold 2x10 3lb 5\"x20 3lb 5\"x20         R wrist extensor/elbow ext stretch 3x30\" 5x10\" elbow bent 10x10\" 10x10\" 10x10\" 10x10\"  10x10\"   10x10\"    Prolonged R elbow ext " "stretch supine               CKC elbow extension stretches         5x 5\" holds 3 ways  5x 5\" holds 4 ways  5x 10\" holds 3 ways HEP   R elbow ext AAROM cane 10x10\" supine 10x10\" supine 10x10\" supine 3x30\" supine 5x30\" seated 5x30\" seated 3 min 2lb weight 2 min 3lb weight - 3 min 3lb weight 3 min 3lb weight 3 min 3lb weight with supination  HEP   R elbow flexion stretch with self OP   10x10\" 3x30\" 10\"x20 10x10\"         Ther Activity               Putty squeeze               DigiGrip Blue 3x10 Blue 3x12 Blk 3x10     Blk 3x10 3x10 black  3x10 black  3x10 black     [place] carry 2lb 1 lap              Gait Training                                             Modalities                                                                  "

## 2024-02-22 NOTE — LETTER
2024    Ra Candelario DO  [address] [facility] [address]    Patient: Eagle Nava   YOB: 1960   Date of Visit: 2024     Encounter Diagnosis     ICD-10-CM    1. S/P carpal tunnel release  Z98.890       2. Pain of right arm  M79.601       3. S/P cubital tunnel release  Z98.890           Dear Dr. Candelario:    Thank you for your recent referral of Eagle Nava. Please review the attached evaluation summary from Eagle's recent visit.     Please verify that you agree with the plan of care by signing the attached order.     If you have any questions or concerns, please do not hesitate to call.     I sincerely appreciate the opportunity to share in the care of one of your patients and hope to have another opportunity to work with you in the near future.       Sincerely,    Emily Ingram, PT      Referring Provider:      I certify that I have read the below Plan of Care and certify the need for these services furnished under this plan of treatment while under my care.                    Ra Candelario DO  [address] [facility] [address]  Via Fax: [phone]          PT Discharge     Today's date: 2024  Patient name: Eagle Nava  : 1960  MRN: [id number]  Referring provider: Ra Candelario DO  Dx:   Encounter Diagnosis     ICD-10-CM    1. S/P carpal tunnel release  Z98.890       2. Pain of right arm  M79.601       3. S/P cubital tunnel release  Z98.890                 Start Time: 0845  Stop Time: 930  Total time in clinic (min): 45 minutes    Subjective: Reports his R elbow and wrist are feeling better and more flexible overall. Reports he is able to use his arm more and has less stiffness in the mornings overall. Reports he also feels improvements in his RUE strength and  strength. Reports no difficulty with ADLs due to his RUE. Reports minimal intermittent pain in his R medial elbow musculature but it is improving and becoming less  frequent overall. Reports he is independent and compliant with his HEP and is agreeable to complete PT today and continue with his HEP to continue making improvements.     Objective: See treatment diary below    Goals  STG - to be met by week 4  1. Patient will be independent with HEP throughout therapy to prepare for eventual transition to maintenance program. - goal met  2. Patient will improve subjective pain to <=4/10 at worst to improve QOL. - goal met  3. Patient will improve R elbow extension AROM to 0 deg to perform ADLs with less difficulty. - not met   4. Patient will improve R wrist AROM to WFL in all directions to perform ADLs with less difficulty. - goal met     LTG - to be met by discharge   1. Patient will improve R  strength to >=60 lbs to improve lifting ability. - goal met  2. Patient will improve RUE strength to >=4+/5 to improve functional mobility. - goal met  3. Patient will improve FOTO score to age matched norm to demonstrate functional improvements. - goal met  4. Patient will be able to perform yard work without difficulty to return to [facility]. - goal met    Palpation      Additional Palpation Details  TTP R medial bicep    Active Range of Motion      Left Elbow   Flexion: 135 degrees   Extension: 0 degrees   Forearm supination: WFL  Forearm pronation: WFL     Right Elbow   Flexion: 135 deg / 120 degrees with pain  Extension: -5 deg /  -30 degrees with pain  Forearm supination: WFL / WFL  Forearm pronation: WFL no pain / WFL and with pain     Additional Active Range of Motion Details  R Wrist AROM:   Flexion: 50 deg / 40 deg with pain  Extension: 50 deg /  30 deg with pain  Ulnar deviation: 20 deg /  10 deg  Radial deviation: 15 deg /  15 deg     L Wrist AROM: WFL no pain all directions           Strength/Myotome Testing      Left Elbow   Flexion: 5  Extension: 5     Right Elbow   Flexion: 5 / 4-  Extension: 5 / 4-     Additional Strength Details  L wrist strength:   Flexion:  "5/5  Extension: 5/5  Ulnar deviation: 5/5  Radial deviation: 5/5     R wrist strength:   Flexion: 5/5 / 4-/5 with pain  Extension: 5/5 / 4-/5  Ulnar deviation: 5/5 / 4-/5  Radial deviation: 5/5 / 4-/5     General Comments:        Elbow Comments   L  strength: 105lb / 100lb  R  strength: 85lb / 35lb      Assessment: Patient presents to PT for discharge. Subjectively, patient reports he has made improvements in his pain levels, R elbow mobility, R wrist mobility, and RUE function since beginning PT. Objectively, patient has made improvements in R elbow AROM, R wrist AROM, and RUE strength. Patient has met a majority of his therapy goals and is independent with his updated HEP. Patient was discharged from physical therapy.        Plan: Patient was discharged from physical therapy.     Precautions: s/p R carpal tunnel release and ulnar nerve transposition on 11/15/23, 10lb lifting restriction        Manuals 12/28 1/2 1/4 1/9 1/11 1/15 1/19 1/25 1/29 2/6 2/13 2/22   R wrist/elbow PROM MK MK MK MK MK MK MK MK MK with extension mobs  MK with extension mobs  MK with extension mobs and distraction MK with extension mobs and distraction   Scar mobilization MK MK MK MK MK MK  MK MK MK MK MK   R elbow/ FA STM MK MK MK MK MK MK MK MK MK MK MK MK   Cupping R elbow     MK MK MK MK MK MK MK MK   Neuro Re-Ed               R hand splay 5\"x10              R thumb opposition                Tendon gliding (tabletop to hook ) 1x15 1x15             Contract relax tricep extension            3x10\"  HEP                                                Ther Ex               Wrist flx/ext AROM 1lb 2x10 ea 2lb 2x10 ea             Wrist UD/RD AROM 1lb 2x10 ea 2lb 2x10 ea             Bicep curl     3lb 2x10 3lb 2x10         Bent over tricep ext  2x10 5\" hold 2x10 5\" hold 2x10 3lb 5\"x20 3lb 5\"x20         R wrist extensor/elbow ext stretch 3x30\" 5x10\" elbow bent 10x10\" 10x10\" 10x10\" 10x10\"  10x10\"   10x10\"    Prolonged R elbow ext " "stretch supine               CKC elbow extension stretches         5x 5\" holds 3 ways  5x 5\" holds 4 ways  5x 10\" holds 3 ways HEP   R elbow ext AAROM cane 10x10\" supine 10x10\" supine 10x10\" supine 3x30\" supine 5x30\" seated 5x30\" seated 3 min 2lb weight 2 min 3lb weight - 3 min 3lb weight 3 min 3lb weight 3 min 3lb weight with supination  HEP   R elbow flexion stretch with self OP   10x10\" 3x30\" 10\"x20 10x10\"         Ther Activity               Putty squeeze               DigiGrip Blue 3x10 Blue 3x12 Blk 3x10     Blk 3x10 3x10 black  3x10 black  3x10 black     [place] carry 2lb 1 lap              Gait Training                                             Modalities                                                                  "

## 2024-02-22 NOTE — PROGRESS NOTES
PT Discharge     Today's date: 2024  Patient name: Eagle Nava  : 1960  MRN: [id number]  Referring provider: Ra Candelario DO  Dx:   Encounter Diagnosis     ICD-10-CM    1. S/P carpal tunnel release  Z98.890       2. Pain of right arm  M79.601       3. S/P cubital tunnel release  Z98.890                 Start Time: 845  Stop Time: 930  Total time in clinic (min): 45 minutes    Subjective: Reports his R elbow and wrist are feeling better and more flexible overall. Reports he is able to use his arm more and has less stiffness in the mornings overall. Reports he also feels improvements in his RUE strength and  strength. Reports no difficulty with ADLs due to his RUE. Reports minimal intermittent pain in his R medial elbow musculature but it is improving and becoming less frequent overall. Reports he is independent and compliant with his HEP and is agreeable to complete PT today and continue with his HEP to continue making improvements.     Objective: See treatment diary below    Goals  STG - to be met by week 4  1. Patient will be independent with HEP throughout therapy to prepare for eventual transition to maintenance program. - goal met  2. Patient will improve subjective pain to <=4/10 at worst to improve QOL. - goal met  3. Patient will improve R elbow extension AROM to 0 deg to perform ADLs with less difficulty. - not met   4. Patient will improve R wrist AROM to WFL in all directions to perform ADLs with less difficulty. - goal met     LTG - to be met by discharge   1. Patient will improve R  strength to >=60 lbs to improve lifting ability. - goal met  2. Patient will improve RUE strength to >=4+/5 to improve functional mobility. - goal met  3. Patient will improve FOTO score to age matched norm to demonstrate functional improvements. - goal met  4. Patient will be able to perform yard work without difficulty to return to [facility]. - goal met    Palpation      Additional Palpation  Details  TTP R medial bicep    Active Range of Motion      Left Elbow   Flexion: 135 degrees   Extension: 0 degrees   Forearm supination: WFL  Forearm pronation: WFL     Right Elbow   Flexion: 135 deg / 120 degrees with pain  Extension: -5 deg /  -30 degrees with pain  Forearm supination: WFL / WFL  Forearm pronation: WFL no pain / WFL and with pain     Additional Active Range of Motion Details  R Wrist AROM:   Flexion: 50 deg / 40 deg with pain  Extension: 50 deg /  30 deg with pain  Ulnar deviation: 20 deg /  10 deg  Radial deviation: 15 deg /  15 deg     L Wrist AROM: WFL no pain all directions           Strength/Myotome Testing      Left Elbow   Flexion: 5  Extension: 5     Right Elbow   Flexion: 5 / 4-  Extension: 5 / 4-     Additional Strength Details  L wrist strength:   Flexion: 5/5  Extension: 5/5  Ulnar deviation: 5/5  Radial deviation: 5/5     R wrist strength:   Flexion: 5/5 / 4-/5 with pain  Extension: 5/5 / 4-/5  Ulnar deviation: 5/5 / 4-/5  Radial deviation: 5/5 / 4-/5     General Comments:        Elbow Comments   L  strength: 105lb / 100lb  R  strength: 85lb / 35lb      Assessment: Patient presents to PT for discharge. Subjectively, patient reports he has made improvements in his pain levels, R elbow mobility, R wrist mobility, and RUE function since beginning PT. Objectively, patient has made improvements in R elbow AROM, R wrist AROM, and RUE strength. Patient has met a majority of his therapy goals and is independent with his updated HEP. Patient was discharged from physical therapy.        Plan: Patient was discharged from physical therapy.     Precautions: s/p R carpal tunnel release and ulnar nerve transposition on 11/15/23, 10lb lifting restriction        Manuals 12/28 1/2 1/4 1/9 1/11 1/15 1/19 1/25 1/29 2/6 2/13 2/22   R wrist/elbow PROM MK MK MK MK MK MK MK MK MK with extension mobs  MK with extension mobs  MK with extension mobs and distraction MK with extension mobs and  "distraction   Scar mobilization MK MK MK MK MK MK  MK MK MK MK MK   R elbow/ FA STM MK MK MK MK MK MK MK MK MK MK MK MK   Cupping R elbow     MK MK MK MK MK MK MK MK   Neuro Re-Ed               R hand splay 5\"x10              R thumb opposition                Tendon gliding (tabletop to hook ) 1x15 1x15             Contract relax tricep extension            3x10\"  HEP                                                Ther Ex               Wrist flx/ext AROM 1lb 2x10 ea 2lb 2x10 ea             Wrist UD/RD AROM 1lb 2x10 ea 2lb 2x10 ea             Bicep curl     3lb 2x10 3lb 2x10         Bent over tricep ext  2x10 5\" hold 2x10 5\" hold 2x10 3lb 5\"x20 3lb 5\"x20         R wrist extensor/elbow ext stretch 3x30\" 5x10\" elbow bent 10x10\" 10x10\" 10x10\" 10x10\"  10x10\"   10x10\"    Prolonged R elbow ext stretch supine               CKC elbow extension stretches         5x 5\" holds 3 ways  5x 5\" holds 4 ways  5x 10\" holds 3 ways HEP   R elbow ext AAROM cane 10x10\" supine 10x10\" supine 10x10\" supine 3x30\" supine 5x30\" seated 5x30\" seated 3 min 2lb weight 2 min 3lb weight - 3 min 3lb weight 3 min 3lb weight 3 min 3lb weight with supination  HEP   R elbow flexion stretch with self OP   10x10\" 3x30\" 10\"x20 10x10\"         Ther Activity               Putty squeeze               DigiGrip Blue 3x10 Blue 3x12 Blk 3x10     Blk 3x10 3x10 black  3x10 black  3x10 black     Lakeline carry 2lb 1 lap              Gait Training                                             Modalities                                                  "

## 2024-02-23 ENCOUNTER — APPOINTMENT (EMERGENCY)
Dept: CT IMAGING | Facility: HOSPITAL | Age: 64
DRG: 390 | End: 2024-02-23
Payer: COMMERCIAL

## 2024-02-23 ENCOUNTER — HOSPITAL ENCOUNTER (INPATIENT)
Facility: HOSPITAL | Age: 64
LOS: 4 days | Discharge: HOME | DRG: 390 | End: 2024-02-27
Attending: EMERGENCY MEDICINE | Admitting: SURGERY
Payer: COMMERCIAL

## 2024-02-23 DIAGNOSIS — R91.1 PULMONARY NODULE: ICD-10-CM

## 2024-02-23 DIAGNOSIS — E78.49 OTHER HYPERLIPIDEMIA: ICD-10-CM

## 2024-02-23 DIAGNOSIS — I25.10 CORONARY ARTERY DISEASE INVOLVING NATIVE CORONARY ARTERY OF NATIVE HEART WITHOUT ANGINA PECTORIS: ICD-10-CM

## 2024-02-23 DIAGNOSIS — K56.609 SBO (SMALL BOWEL OBSTRUCTION) (HCC): Primary | ICD-10-CM

## 2024-02-23 DIAGNOSIS — R55 NEUROCARDIOGENIC SYNCOPE: ICD-10-CM

## 2024-02-23 LAB
ABO GROUP BLD: NORMAL
ABO GROUP BLD: NORMAL
ALBUMIN SERPL BCP-MCNC: 4.6 G/DL (ref 3.5–5)
ALP SERPL-CCNC: 102 U/L (ref 34–104)
ALT SERPL W P-5'-P-CCNC: 37 U/L (ref 7–52)
ANION GAP SERPL CALCULATED.3IONS-SCNC: 8 MMOL/L
APTT PPP: 28 SECONDS (ref 23–37)
AST SERPL W P-5'-P-CCNC: 24 U/L (ref 13–39)
ATRIAL RATE: 80 BPM
BASOPHILS # BLD AUTO: 0.04 THOUSANDS/ÂΜL (ref 0–0.1)
BASOPHILS NFR BLD AUTO: 1 % (ref 0–1)
BILIRUB SERPL-MCNC: 1.35 MG/DL (ref 0.2–1)
BLD GP AB SCN SERPL QL: NEGATIVE
BNP SERPL-MCNC: 49 PG/ML (ref 0–100)
BUN SERPL-MCNC: 13 MG/DL (ref 5–25)
CALCIUM SERPL-MCNC: 9.7 MG/DL (ref 8.4–10.2)
CARDIAC TROPONIN I PNL SERPL HS: 2 NG/L
CHLORIDE SERPL-SCNC: 101 MMOL/L (ref 96–108)
CO2 SERPL-SCNC: 30 MMOL/L (ref 21–32)
CREAT SERPL-MCNC: 0.95 MG/DL (ref 0.6–1.3)
EOSINOPHIL # BLD AUTO: 0.02 THOUSAND/ÂΜL (ref 0–0.61)
EOSINOPHIL NFR BLD AUTO: 0 % (ref 0–6)
ERYTHROCYTE [DISTWIDTH] IN BLOOD BY AUTOMATED COUNT: 13.6 % (ref 11.6–15.1)
GFR SERPL CREATININE-BSD FRML MDRD: 84 ML/MIN/1.73SQ M
GLUCOSE SERPL-MCNC: 131 MG/DL (ref 65–140)
HCT VFR BLD AUTO: 51.9 % (ref 36.5–49.3)
HGB BLD-MCNC: 17.7 G/DL (ref 12–17)
IMM GRANULOCYTES # BLD AUTO: 0.02 THOUSAND/UL (ref 0–0.2)
IMM GRANULOCYTES NFR BLD AUTO: 0 % (ref 0–2)
INR PPP: 0.96 (ref 0.84–1.19)
LACTATE SERPL-SCNC: 1.3 MMOL/L (ref 0.5–2)
LIPASE SERPL-CCNC: 9 U/L (ref 11–82)
LYMPHOCYTES # BLD AUTO: 1.12 THOUSANDS/ÂΜL (ref 0.6–4.47)
LYMPHOCYTES NFR BLD AUTO: 13 % (ref 14–44)
MAGNESIUM SERPL-MCNC: 2.1 MG/DL (ref 1.9–2.7)
MCH RBC QN AUTO: 29 PG (ref 26.8–34.3)
MCHC RBC AUTO-ENTMCNC: 34.1 G/DL (ref 31.4–37.4)
MCV RBC AUTO: 85 FL (ref 82–98)
MONOCYTES # BLD AUTO: 0.65 THOUSAND/ÂΜL (ref 0.17–1.22)
MONOCYTES NFR BLD AUTO: 8 % (ref 4–12)
NEUTROPHILS # BLD AUTO: 6.69 THOUSANDS/ÂΜL (ref 1.85–7.62)
NEUTS SEG NFR BLD AUTO: 78 % (ref 43–75)
NRBC BLD AUTO-RTO: 0 /100 WBCS
P AXIS: 63 DEGREES
PLATELET # BLD AUTO: 207 THOUSANDS/UL (ref 149–390)
PMV BLD AUTO: 10.7 FL (ref 8.9–12.7)
POTASSIUM SERPL-SCNC: 3.6 MMOL/L (ref 3.5–5.3)
PR INTERVAL: 160 MS
PROT SERPL-MCNC: 7.5 G/DL (ref 6.4–8.4)
PROTHROMBIN TIME: 13 SECONDS (ref 11.6–14.5)
QRS AXIS: 86 DEGREES
QRSD INTERVAL: 80 MS
QT INTERVAL: 370 MS
QTC INTERVAL: 426 MS
RBC # BLD AUTO: 6.11 MILLION/UL (ref 3.88–5.62)
RH BLD: POSITIVE
RH BLD: POSITIVE
SODIUM SERPL-SCNC: 139 MMOL/L (ref 135–147)
SPECIMEN EXPIRATION DATE: NORMAL
T WAVE AXIS: 47 DEGREES
VENTRICULAR RATE: 80 BPM
WBC # BLD AUTO: 8.54 THOUSAND/UL (ref 4.31–10.16)

## 2024-02-23 PROCEDURE — 0D9670Z DRAINAGE OF STOMACH WITH DRAINAGE DEVICE, VIA NATURAL OR ARTIFICIAL OPENING: ICD-10-PCS | Performed by: EMERGENCY MEDICINE

## 2024-02-23 PROCEDURE — 85025 COMPLETE CBC W/AUTO DIFF WBC: CPT | Performed by: EMERGENCY MEDICINE

## 2024-02-23 PROCEDURE — 99284 EMERGENCY DEPT VISIT MOD MDM: CPT

## 2024-02-23 PROCEDURE — 99222 1ST HOSP IP/OBS MODERATE 55: CPT | Performed by: SURGERY

## 2024-02-23 PROCEDURE — 93010 ELECTROCARDIOGRAM REPORT: CPT | Performed by: INTERNAL MEDICINE

## 2024-02-23 PROCEDURE — 86850 RBC ANTIBODY SCREEN: CPT | Performed by: EMERGENCY MEDICINE

## 2024-02-23 PROCEDURE — 85730 THROMBOPLASTIN TIME PARTIAL: CPT | Performed by: EMERGENCY MEDICINE

## 2024-02-23 PROCEDURE — 84484 ASSAY OF TROPONIN QUANT: CPT | Performed by: EMERGENCY MEDICINE

## 2024-02-23 PROCEDURE — 74177 CT ABD & PELVIS W/CONTRAST: CPT

## 2024-02-23 PROCEDURE — 83735 ASSAY OF MAGNESIUM: CPT | Performed by: EMERGENCY MEDICINE

## 2024-02-23 PROCEDURE — 86900 BLOOD TYPING SEROLOGIC ABO: CPT | Performed by: EMERGENCY MEDICINE

## 2024-02-23 PROCEDURE — 83880 ASSAY OF NATRIURETIC PEPTIDE: CPT | Performed by: EMERGENCY MEDICINE

## 2024-02-23 PROCEDURE — 85610 PROTHROMBIN TIME: CPT | Performed by: EMERGENCY MEDICINE

## 2024-02-23 PROCEDURE — 96375 TX/PRO/DX INJ NEW DRUG ADDON: CPT

## 2024-02-23 PROCEDURE — 96365 THER/PROPH/DIAG IV INF INIT: CPT

## 2024-02-23 PROCEDURE — 86901 BLOOD TYPING SEROLOGIC RH(D): CPT | Performed by: EMERGENCY MEDICINE

## 2024-02-23 PROCEDURE — 96366 THER/PROPH/DIAG IV INF ADDON: CPT

## 2024-02-23 PROCEDURE — 99285 EMERGENCY DEPT VISIT HI MDM: CPT | Performed by: EMERGENCY MEDICINE

## 2024-02-23 PROCEDURE — 80053 COMPREHEN METABOLIC PANEL: CPT | Performed by: EMERGENCY MEDICINE

## 2024-02-23 PROCEDURE — 83690 ASSAY OF LIPASE: CPT | Performed by: EMERGENCY MEDICINE

## 2024-02-23 PROCEDURE — 83605 ASSAY OF LACTIC ACID: CPT | Performed by: EMERGENCY MEDICINE

## 2024-02-23 PROCEDURE — 93005 ELECTROCARDIOGRAM TRACING: CPT

## 2024-02-23 PROCEDURE — C9113 INJ PANTOPRAZOLE SODIUM, VIA: HCPCS | Performed by: EMERGENCY MEDICINE

## 2024-02-23 PROCEDURE — 36415 COLL VENOUS BLD VENIPUNCTURE: CPT | Performed by: EMERGENCY MEDICINE

## 2024-02-23 RX ORDER — SODIUM CHLORIDE, SODIUM LACTATE, POTASSIUM CHLORIDE, CALCIUM CHLORIDE 600; 310; 30; 20 MG/100ML; MG/100ML; MG/100ML; MG/100ML
125 INJECTION, SOLUTION INTRAVENOUS CONTINUOUS
Status: DISCONTINUED | OUTPATIENT
Start: 2024-02-23 | End: 2024-02-24

## 2024-02-23 RX ORDER — LIDOCAINE HYDROCHLORIDE 20 MG/ML
1 JELLY TOPICAL ONCE
Status: COMPLETED | OUTPATIENT
Start: 2024-02-23 | End: 2024-02-23

## 2024-02-23 RX ORDER — HEPARIN SODIUM 5000 [USP'U]/ML
5000 INJECTION, SOLUTION INTRAVENOUS; SUBCUTANEOUS EVERY 8 HOURS SCHEDULED
Status: DISCONTINUED | OUTPATIENT
Start: 2024-02-23 | End: 2024-02-25

## 2024-02-23 RX ORDER — LIDOCAINE 50 MG/G
1 PATCH TOPICAL DAILY
Status: DISCONTINUED | OUTPATIENT
Start: 2024-02-24 | End: 2024-02-23

## 2024-02-23 RX ORDER — ONDANSETRON 2 MG/ML
4 INJECTION INTRAMUSCULAR; INTRAVENOUS EVERY 6 HOURS PRN
Status: DISCONTINUED | OUTPATIENT
Start: 2024-02-23 | End: 2024-02-24

## 2024-02-23 RX ORDER — LIDOCAINE HYDROCHLORIDE 20 MG/ML
10 SOLUTION OROPHARYNGEAL ONCE
Status: COMPLETED | OUTPATIENT
Start: 2024-02-23 | End: 2024-02-23

## 2024-02-23 RX ORDER — SODIUM CHLORIDE, SODIUM LACTATE, POTASSIUM CHLORIDE, CALCIUM CHLORIDE 600; 310; 30; 20 MG/100ML; MG/100ML; MG/100ML; MG/100ML
125 INJECTION, SOLUTION INTRAVENOUS CONTINUOUS
Status: DISCONTINUED | OUTPATIENT
Start: 2024-02-23 | End: 2024-02-23

## 2024-02-23 RX ORDER — MAGNESIUM HYDROXIDE/ALUMINUM HYDROXICE/SIMETHICONE 120; 1200; 1200 MG/30ML; MG/30ML; MG/30ML
30 SUSPENSION ORAL ONCE
Status: COMPLETED | OUTPATIENT
Start: 2024-02-23 | End: 2024-02-23

## 2024-02-23 RX ORDER — PANTOPRAZOLE SODIUM 40 MG/10ML
40 INJECTION, POWDER, LYOPHILIZED, FOR SOLUTION INTRAVENOUS ONCE
Status: COMPLETED | OUTPATIENT
Start: 2024-02-23 | End: 2024-02-23

## 2024-02-23 RX ORDER — ONDANSETRON 2 MG/ML
4 INJECTION INTRAMUSCULAR; INTRAVENOUS ONCE
Status: COMPLETED | OUTPATIENT
Start: 2024-02-23 | End: 2024-02-23

## 2024-02-23 RX ORDER — METOPROLOL TARTRATE 1 MG/ML
5 INJECTION, SOLUTION INTRAVENOUS EVERY 8 HOURS
Status: DISCONTINUED | OUTPATIENT
Start: 2024-02-23 | End: 2024-02-25

## 2024-02-23 RX ORDER — ONDANSETRON 2 MG/ML
4 INJECTION INTRAMUSCULAR; INTRAVENOUS ONCE AS NEEDED
Status: DISCONTINUED | OUTPATIENT
Start: 2024-02-23 | End: 2024-02-23

## 2024-02-23 RX ORDER — LIDOCAINE 50 MG/G
1 PATCH TOPICAL DAILY
Status: DISCONTINUED | OUTPATIENT
Start: 2024-02-23 | End: 2024-02-27 | Stop reason: HOSPADM

## 2024-02-23 RX ADMIN — ONDANSETRON 4 MG: 2 INJECTION INTRAMUSCULAR; INTRAVENOUS at 12:14

## 2024-02-23 RX ADMIN — PANTOPRAZOLE SODIUM 40 MG: 40 INJECTION, POWDER, FOR SOLUTION INTRAVENOUS at 12:17

## 2024-02-23 RX ADMIN — IOHEXOL 100 ML: 350 INJECTION, SOLUTION INTRAVENOUS at 13:07

## 2024-02-23 RX ADMIN — ALUMINUM HYDROXIDE, MAGNESIUM HYDROXIDE, AND DIMETHICONE 30 ML: 200; 20; 200 SUSPENSION ORAL at 12:19

## 2024-02-23 RX ADMIN — SODIUM CHLORIDE, SODIUM LACTATE, POTASSIUM CHLORIDE, AND CALCIUM CHLORIDE 150 ML/HR: .6; .31; .03; .02 INJECTION, SOLUTION INTRAVENOUS at 12:21

## 2024-02-23 RX ADMIN — LIDOCAINE HYDROCHLORIDE 10 ML: 20 SOLUTION ORAL at 12:19

## 2024-02-23 RX ADMIN — SODIUM CHLORIDE, SODIUM LACTATE, POTASSIUM CHLORIDE, AND CALCIUM CHLORIDE 500 ML: .6; .31; .03; .02 INJECTION, SOLUTION INTRAVENOUS at 12:21

## 2024-02-23 RX ADMIN — LIDOCAINE HYDROCHLORIDE 1 APPLICATION: 20 JELLY TOPICAL at 15:48

## 2024-02-23 RX ADMIN — HEPARIN SODIUM 5000 UNITS: 5000 INJECTION INTRAVENOUS; SUBCUTANEOUS at 21:43

## 2024-02-23 RX ADMIN — LIDOCAINE 5% 1 PATCH: 700 PATCH TOPICAL at 19:07

## 2024-02-23 RX ADMIN — METOROPROLOL TARTRATE 5 MG: 5 INJECTION, SOLUTION INTRAVENOUS at 18:43

## 2024-02-23 NOTE — PLAN OF CARE
Problem: PAIN - ADULT  Goal: Verbalizes/displays adequate comfort level or baseline comfort level  Description: Interventions:  - Encourage patient to monitor pain and request assistance  - Assess pain using appropriate pain scale  - Administer analgesics based on type and severity of pain and evaluate response  - Implement non-pharmacological measures as appropriate and evaluate response  - Consider cultural and social influences on pain and pain management  - Notify physician/advanced practitioner if interventions unsuccessful or patient reports new pain  Outcome: Progressing     Problem: INFECTION - ADULT  Goal: Absence or prevention of progression during hospitalization  Description: INTERVENTIONS:  - Assess and monitor for signs and symptoms of infection  - Monitor lab/diagnostic results  - Monitor all insertion sites, i.e. indwelling lines, tubes, and drains  - Monitor endotracheal if appropriate and nasal secretions for changes in amount and color  - [place] appropriate cooling/warming therapies per order  - Administer medications as ordered  - Instruct and encourage patient and family to use good hand hygiene technique  - Identify and instruct in appropriate isolation precautions for identified infection/condition  Outcome: Progressing     Problem: SAFETY ADULT  Goal: Patient will remain free of falls  Description: INTERVENTIONS:  - Educate patient/family on patient safety including physical limitations  - Instruct patient to call for assistance with activity   - Consult OT/PT to assist with strengthening/mobility   - Keep Call bell within reach  - Keep bed low and locked with side rails adjusted as appropriate  - Keep care items and personal belongings within reach  - Initiate and maintain comfort rounds  - Make Fall Risk Sign visible to staff  - Apply yellow socks and bracelet for high fall risk patients  - Consider moving patient to room near nurses station  Outcome: Progressing  Goal: Maintain or  return to baseline ADL function  Description: INTERVENTIONS:  -  Assess patient's ability to carry out ADLs; assess patient's baseline for ADL function and identify physical deficits which impact ability to perform ADLs (bathing, care of mouth/teeth, toileting, grooming, dressing, etc.)  - Assess/evaluate cause of self-care deficits   - Assess range of motion  - Assess patient's mobility; develop plan if impaired  - Assess patient's need for assistive devices and provide as appropriate  - Encourage maximum independence but intervene and supervise when necessary  - Involve family in performance of ADLs  - Assess for home care needs following discharge   - Consider OT consult to assist with ADL evaluation and planning for discharge  - Provide patient education as appropriate  Outcome: Progressing  Goal: Maintains/Returns to pre admission functional level  Description: INTERVENTIONS:  - Perform AM-PAC 6 Click Basic Mobility/ Daily Activity assessment daily.  - Set and communicate daily mobility goal to care team and patient/family/caregiver.   - Collaborate with rehabilitation services on mobility goals if consulted  - Perform Range of Motion 3 times a day.  - Reposition patient every 3 hours.  - Dangle patient 3 times a day  - Stand patient 3 times a day  - Ambulate patient 3 times a day  - Out of bed to chair 3 times a day   - Out of bed for meals 3 times a day  - Out of bed for toileting  - Record patient progress and toleration of activity level   Outcome: Progressing     Problem: DISCHARGE PLANNING  Goal: Discharge to home or other facility with appropriate resources  Description: INTERVENTIONS:  - Identify barriers to discharge w/patient and caregiver  - Arrange for needed discharge resources and transportation as appropriate  - Identify discharge learning needs (meds, wound care, etc.)  - Arrange for interpretive services to assist at discharge as needed  - Refer to Case Management Department for coordinating  discharge planning if the patient needs post-hospital services based on physician/advanced practitioner order or complex needs related to functional status, cognitive ability, or social support system  Outcome: Progressing     Problem: GASTROINTESTINAL - ADULT  Goal: Minimal or absence of nausea and/or vomiting  Description: INTERVENTIONS:  - Administer IV fluids if ordered to ensure adequate hydration  - Maintain NPO status until nausea and vomiting are resolved  - Nasogastric tube if ordered  - Administer ordered antiemetic medications as needed  - Provide nonpharmacologic comfort measures as appropriate  - Advance diet as tolerated, if ordered  - Consider nutrition services referral to assist patient with adequate nutrition and appropriate food choices  Outcome: Progressing  Goal: Maintains or returns to baseline bowel function  Description: INTERVENTIONS:  - Assess bowel function  - Encourage oral fluids to ensure adequate hydration  - Administer IV fluids if ordered to ensure adequate hydration  - Administer ordered medications as needed  - Encourage mobilization and activity  - Consider nutritional services referral to assist patient with adequate nutrition and appropriate food choices  Outcome: Progressing  Goal: Maintains adequate nutritional intake  Description: INTERVENTIONS:  - Monitor percentage of each meal consumed  - Identify factors contributing to decreased intake, treat as appropriate  - Assist with meals as needed  - Monitor I&O, weight, and lab values if indicated  - Obtain nutrition services referral as needed  Outcome: Progressing
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
## 2024-02-24 ENCOUNTER — APPOINTMENT (INPATIENT)
Dept: RADIOLOGY | Facility: HOSPITAL | Age: 64
DRG: 390 | End: 2024-02-24
Payer: COMMERCIAL

## 2024-02-24 PROBLEM — E78.5 HLD (HYPERLIPIDEMIA): Status: ACTIVE | Noted: 2024-02-24

## 2024-02-24 PROBLEM — I25.10 CORONARY ARTERY DISEASE: Status: ACTIVE | Noted: 2024-02-24

## 2024-02-24 PROBLEM — R55 NEUROCARDIOGENIC SYNCOPE: Status: ACTIVE | Noted: 2024-02-24

## 2024-02-24 PROBLEM — K56.609 SBO (SMALL BOWEL OBSTRUCTION) (HCC): Status: ACTIVE | Noted: 2024-02-24

## 2024-02-24 LAB
ALBUMIN SERPL BCP-MCNC: 3.7 G/DL (ref 3.5–5)
ALP SERPL-CCNC: 80 U/L (ref 34–104)
ALT SERPL W P-5'-P-CCNC: 25 U/L (ref 7–52)
ANION GAP SERPL CALCULATED.3IONS-SCNC: 8 MMOL/L
AST SERPL W P-5'-P-CCNC: 19 U/L (ref 13–39)
BILIRUB SERPL-MCNC: 1.38 MG/DL (ref 0.2–1)
BUN SERPL-MCNC: 13 MG/DL (ref 5–25)
CALCIUM SERPL-MCNC: 8.6 MG/DL (ref 8.4–10.2)
CHLORIDE SERPL-SCNC: 104 MMOL/L (ref 96–108)
CO2 SERPL-SCNC: 27 MMOL/L (ref 21–32)
CREAT SERPL-MCNC: 1.07 MG/DL (ref 0.6–1.3)
ERYTHROCYTE [DISTWIDTH] IN BLOOD BY AUTOMATED COUNT: 13.7 % (ref 11.6–15.1)
GFR SERPL CREATININE-BSD FRML MDRD: 72 ML/MIN/1.73SQ M
GLUCOSE SERPL-MCNC: 110 MG/DL (ref 65–140)
HCT VFR BLD AUTO: 46.7 % (ref 36.5–49.3)
HGB BLD-MCNC: 15.7 G/DL (ref 12–17)
MAGNESIUM SERPL-MCNC: 2.1 MG/DL (ref 1.9–2.7)
MCH RBC QN AUTO: 29.4 PG (ref 26.8–34.3)
MCHC RBC AUTO-ENTMCNC: 33.6 G/DL (ref 31.4–37.4)
MCV RBC AUTO: 88 FL (ref 82–98)
PLATELET # BLD AUTO: 191 THOUSANDS/UL (ref 149–390)
PMV BLD AUTO: 11 FL (ref 8.9–12.7)
POTASSIUM SERPL-SCNC: 3.9 MMOL/L (ref 3.5–5.3)
PROT SERPL-MCNC: 6 G/DL (ref 6.4–8.4)
RBC # BLD AUTO: 5.34 MILLION/UL (ref 3.88–5.62)
SODIUM SERPL-SCNC: 139 MMOL/L (ref 135–147)
WBC # BLD AUTO: 9.37 THOUSAND/UL (ref 4.31–10.16)

## 2024-02-24 PROCEDURE — 99232 SBSQ HOSP IP/OBS MODERATE 35: CPT | Performed by: SURGERY

## 2024-02-24 PROCEDURE — 80053 COMPREHEN METABOLIC PANEL: CPT | Performed by: SURGERY

## 2024-02-24 PROCEDURE — 85027 COMPLETE CBC AUTOMATED: CPT | Performed by: SURGERY

## 2024-02-24 PROCEDURE — 99221 1ST HOSP IP/OBS SF/LOW 40: CPT | Performed by: INTERNAL MEDICINE

## 2024-02-24 PROCEDURE — 83735 ASSAY OF MAGNESIUM: CPT | Performed by: SURGERY

## 2024-02-24 PROCEDURE — 71045 X-RAY EXAM CHEST 1 VIEW: CPT

## 2024-02-24 PROCEDURE — 74019 RADEX ABDOMEN 2 VIEWS: CPT

## 2024-02-24 RX ORDER — ACETAMINOPHEN 10 MG/ML
1000 INJECTION, SOLUTION INTRAVENOUS EVERY 8 HOURS
Status: DISCONTINUED | OUTPATIENT
Start: 2024-02-24 | End: 2024-02-26

## 2024-02-24 RX ORDER — ONDANSETRON 2 MG/ML
4 INJECTION INTRAMUSCULAR; INTRAVENOUS EVERY 4 HOURS PRN
Status: DISCONTINUED | OUTPATIENT
Start: 2024-02-24 | End: 2024-02-27 | Stop reason: HOSPADM

## 2024-02-24 RX ORDER — HYDROMORPHONE HCL/PF 1 MG/ML
0.5 SYRINGE (ML) INJECTION EVERY 4 HOURS PRN
Status: DISCONTINUED | OUTPATIENT
Start: 2024-02-24 | End: 2024-02-26

## 2024-02-24 RX ORDER — DEXTROSE MONOHYDRATE, SODIUM CHLORIDE, AND POTASSIUM CHLORIDE 50; 1.49; 4.5 G/1000ML; G/1000ML; G/1000ML
75 INJECTION, SOLUTION INTRAVENOUS CONTINUOUS
Status: DISCONTINUED | OUTPATIENT
Start: 2024-02-24 | End: 2024-02-26

## 2024-02-24 RX ORDER — PROMETHAZINE HYDROCHLORIDE 25 MG/ML
12.5 INJECTION, SOLUTION INTRAMUSCULAR; INTRAVENOUS EVERY 6 HOURS PRN
Status: DISCONTINUED | OUTPATIENT
Start: 2024-02-24 | End: 2024-02-26

## 2024-02-24 RX ORDER — HYDROMORPHONE HCL IN WATER/PF 6 MG/30 ML
0.2 PATIENT CONTROLLED ANALGESIA SYRINGE INTRAVENOUS EVERY 4 HOURS PRN
Status: DISCONTINUED | OUTPATIENT
Start: 2024-02-24 | End: 2024-02-26

## 2024-02-24 RX ORDER — HYDROMORPHONE HCL IN WATER/PF 6 MG/30 ML
0.2 PATIENT CONTROLLED ANALGESIA SYRINGE INTRAVENOUS EVERY 4 HOURS PRN
Status: DISCONTINUED | OUTPATIENT
Start: 2024-02-24 | End: 2024-02-27 | Stop reason: HOSPADM

## 2024-02-24 RX ADMIN — HEPARIN SODIUM 5000 UNITS: 5000 INJECTION INTRAVENOUS; SUBCUTANEOUS at 21:59

## 2024-02-24 RX ADMIN — ONDANSETRON 4 MG: 2 INJECTION INTRAMUSCULAR; INTRAVENOUS at 13:17

## 2024-02-24 RX ADMIN — METOROPROLOL TARTRATE 5 MG: 5 INJECTION, SOLUTION INTRAVENOUS at 02:57

## 2024-02-24 RX ADMIN — METOROPROLOL TARTRATE 5 MG: 5 INJECTION, SOLUTION INTRAVENOUS at 11:17

## 2024-02-24 RX ADMIN — HEPARIN SODIUM 5000 UNITS: 5000 INJECTION INTRAVENOUS; SUBCUTANEOUS at 05:22

## 2024-02-24 RX ADMIN — ACETAMINOPHEN 1000 MG: 10 INJECTION INTRAVENOUS at 17:53

## 2024-02-24 RX ADMIN — LIDOCAINE 5% 1 PATCH: 700 PATCH TOPICAL at 21:58

## 2024-02-24 RX ADMIN — METOROPROLOL TARTRATE 5 MG: 5 INJECTION, SOLUTION INTRAVENOUS at 19:54

## 2024-02-24 RX ADMIN — SODIUM CHLORIDE, SODIUM LACTATE, POTASSIUM CHLORIDE, AND CALCIUM CHLORIDE 125 ML/HR: .6; .31; .03; .02 INJECTION, SOLUTION INTRAVENOUS at 02:56

## 2024-02-24 RX ADMIN — DEXTROSE, SODIUM CHLORIDE, AND POTASSIUM CHLORIDE 125 ML/HR: 5; .45; .15 INJECTION INTRAVENOUS at 09:38

## 2024-02-24 RX ADMIN — DEXTROSE, SODIUM CHLORIDE, AND POTASSIUM CHLORIDE 125 ML/HR: 5; .45; .15 INJECTION INTRAVENOUS at 18:00

## 2024-02-24 RX ADMIN — HEPARIN SODIUM 5000 UNITS: 5000 INJECTION INTRAVENOUS; SUBCUTANEOUS at 13:17

## 2024-02-24 NOTE — PLAN OF CARE
Problem: PAIN - ADULT  Goal: Verbalizes/displays adequate comfort level or baseline comfort level  Description: Interventions:  - Encourage patient to monitor pain and request assistance  - Assess pain using appropriate pain scale  - Administer analgesics based on type and severity of pain and evaluate response  - Implement non-pharmacological measures as appropriate and evaluate response  - Consider cultural and social influences on pain and pain management  - Notify physician/advanced practitioner if interventions unsuccessful or patient reports new pain  Outcome: Progressing     Problem: INFECTION - ADULT  Goal: Absence or prevention of progression during hospitalization  Description: INTERVENTIONS:  - Assess and monitor for signs and symptoms of infection  - Monitor lab/diagnostic results  - Monitor all insertion sites, i.e. indwelling lines, tubes, and drains  - Monitor endotracheal if appropriate and nasal secretions for changes in amount and color  - [place] appropriate cooling/warming therapies per order  - Administer medications as ordered  - Instruct and encourage patient and family to use good hand hygiene technique  - Identify and instruct in appropriate isolation precautions for identified infection/condition  Outcome: Progressing     Problem: SAFETY ADULT  Goal: Patient will remain free of falls  Description: INTERVENTIONS:  - Educate patient/family on patient safety including physical limitations  - Instruct patient to call for assistance with activity   - Consult OT/PT to assist with strengthening/mobility   - Keep Call bell within reach  - Keep bed low and locked with side rails adjusted as appropriate  - Keep care items and personal belongings within reach  - Initiate and maintain comfort rounds  - Make Fall Risk Sign visible to staff  - Apply yellow socks and bracelet for high fall risk patients  - Consider moving patient to room near nurses station  Outcome: Progressing  Goal: Maintain or  return to baseline ADL function  Description: INTERVENTIONS:  -  Assess patient's ability to carry out ADLs; assess patient's baseline for ADL function and identify physical deficits which impact ability to perform ADLs (bathing, care of mouth/teeth, toileting, grooming, dressing, etc.)  - Assess/evaluate cause of self-care deficits   - Assess range of motion  - Assess patient's mobility; develop plan if impaired  - Assess patient's need for assistive devices and provide as appropriate  - Encourage maximum independence but intervene and supervise when necessary  - Involve family in performance of ADLs  - Assess for home care needs following discharge   - Consider OT consult to assist with ADL evaluation and planning for discharge  - Provide patient education as appropriate  Outcome: Progressing  Goal: Maintains/Returns to pre admission functional level  Description: INTERVENTIONS:  - Perform AM-PAC 6 Click Basic Mobility/ Daily Activity assessment daily.  - Set and communicate daily mobility goal to care team and patient/family/caregiver.   - Collaborate with rehabilitation services on mobility goals if consulted  - Perform Range of Motion 3 times a day.  - Reposition patient every 3 hours.  - Dangle patient 3 times a day  - Stand patient 3 times a day  - Ambulate patient 3 times a day  - Out of bed to chair 3 times a day   - Out of bed for meals 3 times a day  - Out of bed for toileting  - Record patient progress and toleration of activity level   Outcome: Progressing     Problem: DISCHARGE PLANNING  Goal: Discharge to home or other facility with appropriate resources  Description: INTERVENTIONS:  - Identify barriers to discharge w/patient and caregiver  - Arrange for needed discharge resources and transportation as appropriate  - Identify discharge learning needs (meds, wound care, etc.)  - Arrange for interpretive services to assist at discharge as needed  - Refer to Case Management Department for coordinating  discharge planning if the patient needs post-hospital services based on physician/advanced practitioner order or complex needs related to functional status, cognitive ability, or social support system  Outcome: Progressing     Problem: GASTROINTESTINAL - ADULT  Goal: Minimal or absence of nausea and/or vomiting  Description: INTERVENTIONS:  - Administer IV fluids if ordered to ensure adequate hydration  - Maintain NPO status until nausea and vomiting are resolved  - Nasogastric tube if ordered  - Administer ordered antiemetic medications as needed  - Provide nonpharmacologic comfort measures as appropriate  - Advance diet as tolerated, if ordered  - Consider nutrition services referral to assist patient with adequate nutrition and appropriate food choices  Outcome: Progressing  Goal: Maintains or returns to baseline bowel function  Description: INTERVENTIONS:  - Assess bowel function  - Encourage oral fluids to ensure adequate hydration  - Administer IV fluids if ordered to ensure adequate hydration  - Administer ordered medications as needed  - Encourage mobilization and activity  - Consider nutritional services referral to assist patient with adequate nutrition and appropriate food choices  Outcome: Progressing  Goal: Maintains adequate nutritional intake  Description: INTERVENTIONS:  - Monitor percentage of each meal consumed  - Identify factors contributing to decreased intake, treat as appropriate  - Assist with meals as needed  - Monitor I&O, weight, and lab values if indicated  - Obtain nutrition services referral as needed  Outcome: Progressing
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
## 2024-02-24 NOTE — PLAN OF CARE
Problem: PAIN - ADULT  Goal: Verbalizes/displays adequate comfort level or baseline comfort level  Description: Interventions:  - Encourage patient to monitor pain and request assistance  - Assess pain using appropriate pain scale  - Administer analgesics based on type and severity of pain and evaluate response  - Implement non-pharmacological measures as appropriate and evaluate response  - Consider cultural and social influences on pain and pain management  - Notify physician/advanced practitioner if interventions unsuccessful or patient reports new pain  Outcome: Progressing     Problem: INFECTION - ADULT  Goal: Absence or prevention of progression during hospitalization  Description: INTERVENTIONS:  - Assess and monitor for signs and symptoms of infection  - Monitor lab/diagnostic results  - Monitor all insertion sites, i.e. indwelling lines, tubes, and drains  - Monitor endotracheal if appropriate and nasal secretions for changes in amount and color  - [place] appropriate cooling/warming therapies per order  - Administer medications as ordered  - Instruct and encourage patient and family to use good hand hygiene technique  - Identify and instruct in appropriate isolation precautions for identified infection/condition  Outcome: Progressing     Problem: SAFETY ADULT  Goal: Patient will remain free of falls  Description: INTERVENTIONS:  - Educate patient/family on patient safety including physical limitations  - Instruct patient to call for assistance with activity   - Consult OT/PT to assist with strengthening/mobility   - Keep Call bell within reach  - Keep bed low and locked with side rails adjusted as appropriate  - Keep care items and personal belongings within reach  - Initiate and maintain comfort rounds  - Make Fall Risk Sign visible to staff  - Apply yellow socks and bracelet for high fall risk patients  - Consider moving patient to room near nurses station  Outcome: Progressing  Goal: Maintain or  return to baseline ADL function  Description: INTERVENTIONS:  -  Assess patient's ability to carry out ADLs; assess patient's baseline for ADL function and identify physical deficits which impact ability to perform ADLs (bathing, care of mouth/teeth, toileting, grooming, dressing, etc.)  - Assess/evaluate cause of self-care deficits   - Assess range of motion  - Assess patient's mobility; develop plan if impaired  - Assess patient's need for assistive devices and provide as appropriate  - Encourage maximum independence but intervene and supervise when necessary  - Involve family in performance of ADLs  - Assess for home care needs following discharge   - Consider OT consult to assist with ADL evaluation and planning for discharge  - Provide patient education as appropriate  Outcome: Progressing  Goal: Maintains/Returns to pre admission functional level  Description: INTERVENTIONS:  - Perform AM-PAC 6 Click Basic Mobility/ Daily Activity assessment daily.  - Set and communicate daily mobility goal to care team and patient/family/caregiver.   - Collaborate with rehabilitation services on mobility goals if consulted  - Perform Range of Motion 3 times a day.  - Reposition patient every 3 hours.  - Dangle patient 3 times a day  - Stand patient 3 times a day  - Ambulate patient 3 times a day  - Out of bed to chair 3 times a day   - Out of bed for meals 3 times a day  - Out of bed for toileting  - Record patient progress and toleration of activity level   Outcome: Progressing     Problem: DISCHARGE PLANNING  Goal: Discharge to home or other facility with appropriate resources  Description: INTERVENTIONS:  - Identify barriers to discharge w/patient and caregiver  - Arrange for needed discharge resources and transportation as appropriate  - Identify discharge learning needs (meds, wound care, etc.)  - Arrange for interpretive services to assist at discharge as needed  - Refer to Case Management Department for coordinating  discharge planning if the patient needs post-hospital services based on physician/advanced practitioner order or complex needs related to functional status, cognitive ability, or social support system  Outcome: Progressing     Problem: GASTROINTESTINAL - ADULT  Goal: Minimal or absence of nausea and/or vomiting  Description: INTERVENTIONS:  - Administer IV fluids if ordered to ensure adequate hydration  - Maintain NPO status until nausea and vomiting are resolved  - Nasogastric tube if ordered  - Administer ordered antiemetic medications as needed  - Provide nonpharmacologic comfort measures as appropriate  - Advance diet as tolerated, if ordered  - Consider nutrition services referral to assist patient with adequate nutrition and appropriate food choices  Outcome: Progressing  Goal: Maintains or returns to baseline bowel function  Description: INTERVENTIONS:  - Assess bowel function  - Encourage oral fluids to ensure adequate hydration  - Administer IV fluids if ordered to ensure adequate hydration  - Administer ordered medications as needed  - Encourage mobilization and activity  - Consider nutritional services referral to assist patient with adequate nutrition and appropriate food choices  Outcome: Progressing  Goal: Maintains adequate nutritional intake  Description: INTERVENTIONS:  - Monitor percentage of each meal consumed  - Identify factors contributing to decreased intake, treat as appropriate  - Assist with meals as needed  - Monitor I&O, weight, and lab values if indicated  - Obtain nutrition services referral as needed  Outcome: Progressing

## 2024-02-24 NOTE — PLAN OF CARE
Problem: PAIN - ADULT  Goal: Verbalizes/displays adequate comfort level or baseline comfort level  Description: Interventions:  - Encourage patient to monitor pain and request assistance  - Assess pain using appropriate pain scale  - Administer analgesics based on type and severity of pain and evaluate response  - Implement non-pharmacological measures as appropriate and evaluate response  - Consider cultural and social influences on pain and pain management  - Notify physician/advanced practitioner if interventions unsuccessful or patient reports new pain  2/23/2024 1931 by Maria Esther Benton RN  Outcome: Progressing  2/23/2024 1930 by Maria Esther Benton RN  Outcome: Progressing     Problem: INFECTION - ADULT  Goal: Absence or prevention of progression during hospitalization  Description: INTERVENTIONS:  - Assess and monitor for signs and symptoms of infection  - Monitor lab/diagnostic results  - Monitor all insertion sites, i.e. indwelling lines, tubes, and drains  - Monitor endotracheal if appropriate and nasal secretions for changes in amount and color  - [place] appropriate cooling/warming therapies per order  - Administer medications as ordered  - Instruct and encourage patient and family to use good hand hygiene technique  - Identify and instruct in appropriate isolation precautions for identified infection/condition  2/23/2024 1931 by Maria Esther Benton RN  Outcome: Progressing  2/23/2024 1930 by Maria Esther Benton RN  Outcome: Progressing     Problem: SAFETY ADULT  Goal: Patient will remain free of falls  Description: INTERVENTIONS:  - Educate patient/family on patient safety including physical limitations  - Instruct patient to call for assistance with activity   - Consult OT/PT to assist with strengthening/mobility   - Keep Call bell within reach  - Keep bed low and locked with side rails adjusted as appropriate  - Keep care items and personal belongings within reach  - Initiate and maintain comfort rounds  - Make  Fall Risk Sign visible to staff  - Apply yellow socks and bracelet for high fall risk patients  - Consider moving patient to room near nurses station  2/23/2024 1931 by Maria Esther Benton RN  Outcome: Progressing  2/23/2024 1930 by Maria Esther Benton RN  Outcome: Progressing  Goal: Maintain or return to baseline ADL function  Description: INTERVENTIONS:  -  Assess patient's ability to carry out ADLs; assess patient's baseline for ADL function and identify physical deficits which impact ability to perform ADLs (bathing, care of mouth/teeth, toileting, grooming, dressing, etc.)  - Assess/evaluate cause of self-care deficits   - Assess range of motion  - Assess patient's mobility; develop plan if impaired  - Assess patient's need for assistive devices and provide as appropriate  - Encourage maximum independence but intervene and supervise when necessary  - Involve family in performance of ADLs  - Assess for home care needs following discharge   - Consider OT consult to assist with ADL evaluation and planning for discharge  - Provide patient education as appropriate  2/23/2024 1931 by Maria Esther Benton RN  Outcome: Progressing  2/23/2024 1930 by Maria Esther Benton RN  Outcome: Progressing  Goal: Maintains/Returns to pre admission functional level  Description: INTERVENTIONS:  - Perform AM-PAC 6 Click Basic Mobility/ Daily Activity assessment daily.  - Set and communicate daily mobility goal to care team and patient/family/caregiver.   - Collaborate with rehabilitation services on mobility goals if consulted  - Perform Range of Motion 3 times a day.  - Reposition patient every 3 hours.  - Dangle patient 3 times a day  - Stand patient 3 times a day  - Ambulate patient 3 times a day  - Out of bed to chair 3 times a day   - Out of bed for meals 3 times a day  - Out of bed for toileting  - Record patient progress and toleration of activity level   2/23/2024 1931 by Maria Esther Benton RN  Outcome: Progressing  2/23/2024 1930 by Maria Esther Benton  RN  Outcome: Progressing     Problem: DISCHARGE PLANNING  Goal: Discharge to home or other facility with appropriate resources  Description: INTERVENTIONS:  - Identify barriers to discharge w/patient and caregiver  - Arrange for needed discharge resources and transportation as appropriate  - Identify discharge learning needs (meds, wound care, etc.)  - Arrange for interpretive services to assist at discharge as needed  - Refer to Case Management Department for coordinating discharge planning if the patient needs post-hospital services based on physician/advanced practitioner order or complex needs related to functional status, cognitive ability, or social support system  2/23/2024 1931 by Maria Esther Benton RN  Outcome: Progressing  2/23/2024 1930 by Maria Esther Benton RN  Outcome: Progressing     Problem: GASTROINTESTINAL - ADULT  Goal: Minimal or absence of nausea and/or vomiting  Description: INTERVENTIONS:  - Administer IV fluids if ordered to ensure adequate hydration  - Maintain NPO status until nausea and vomiting are resolved  - Nasogastric tube if ordered  - Administer ordered antiemetic medications as needed  - Provide nonpharmacologic comfort measures as appropriate  - Advance diet as tolerated, if ordered  - Consider nutrition services referral to assist patient with adequate nutrition and appropriate food choices  2/23/2024 1931 by Maria Esther Benton RN  Outcome: Progressing  2/23/2024 1930 by Maria Esther Benton RN  Outcome: Progressing  Goal: Maintains or returns to baseline bowel function  Description: INTERVENTIONS:  - Assess bowel function  - Encourage oral fluids to ensure adequate hydration  - Administer IV fluids if ordered to ensure adequate hydration  - Administer ordered medications as needed  - Encourage mobilization and activity  - Consider nutritional services referral to assist patient with adequate nutrition and appropriate food choices  2/23/2024 1931 by Maria Esther Benton RN  Outcome:  Progressing  2/23/2024 1930 by Maria Esther Benton, RN  Outcome: Progressing  Goal: Maintains adequate nutritional intake  Description: INTERVENTIONS:  - Monitor percentage of each meal consumed  - Identify factors contributing to decreased intake, treat as appropriate  - Assist with meals as needed  - Monitor I&O, weight, and lab values if indicated  - Obtain nutrition services referral as needed  2/23/2024 1931 by Maria Esther Benton RN  Outcome: Progressing  2/23/2024 1930 by Maria Esther Benton RN  Outcome: Progressing
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
## 2024-02-25 ENCOUNTER — APPOINTMENT (INPATIENT)
Dept: RADIOLOGY | Facility: HOSPITAL | Age: 64
DRG: 390 | End: 2024-02-25
Payer: COMMERCIAL

## 2024-02-25 LAB
ANION GAP SERPL CALCULATED.3IONS-SCNC: 6 MMOL/L
BASOPHILS # BLD AUTO: 0.04 THOUSANDS/ÂΜL (ref 0–0.1)
BASOPHILS NFR BLD AUTO: 1 % (ref 0–1)
BUN SERPL-MCNC: 10 MG/DL (ref 5–25)
CALCIUM SERPL-MCNC: 8.2 MG/DL (ref 8.4–10.2)
CHLORIDE SERPL-SCNC: 104 MMOL/L (ref 96–108)
CO2 SERPL-SCNC: 26 MMOL/L (ref 21–32)
CREAT SERPL-MCNC: 0.92 MG/DL (ref 0.6–1.3)
EOSINOPHIL # BLD AUTO: 0.08 THOUSAND/ÂΜL (ref 0–0.61)
EOSINOPHIL NFR BLD AUTO: 1 % (ref 0–6)
ERYTHROCYTE [DISTWIDTH] IN BLOOD BY AUTOMATED COUNT: 13.4 % (ref 11.6–15.1)
GFR SERPL CREATININE-BSD FRML MDRD: 87 ML/MIN/1.73SQ M
GLUCOSE SERPL-MCNC: 119 MG/DL (ref 65–140)
HCT VFR BLD AUTO: 45.4 % (ref 36.5–49.3)
HGB BLD-MCNC: 15.5 G/DL (ref 12–17)
IMM GRANULOCYTES # BLD AUTO: 0.02 THOUSAND/UL (ref 0–0.2)
IMM GRANULOCYTES NFR BLD AUTO: 0 % (ref 0–2)
LYMPHOCYTES # BLD AUTO: 2.07 THOUSANDS/ÂΜL (ref 0.6–4.47)
LYMPHOCYTES NFR BLD AUTO: 24 % (ref 14–44)
MAGNESIUM SERPL-MCNC: 2.1 MG/DL (ref 1.9–2.7)
MCH RBC QN AUTO: 30.2 PG (ref 26.8–34.3)
MCHC RBC AUTO-ENTMCNC: 34.1 G/DL (ref 31.4–37.4)
MCV RBC AUTO: 88 FL (ref 82–98)
MONOCYTES # BLD AUTO: 0.86 THOUSAND/ÂΜL (ref 0.17–1.22)
MONOCYTES NFR BLD AUTO: 10 % (ref 4–12)
NEUTROPHILS # BLD AUTO: 5.68 THOUSANDS/ÂΜL (ref 1.85–7.62)
NEUTS SEG NFR BLD AUTO: 64 % (ref 43–75)
NRBC BLD AUTO-RTO: 0 /100 WBCS
PHOSPHATE SERPL-MCNC: 2.9 MG/DL (ref 2.3–4.1)
PLATELET # BLD AUTO: 177 THOUSANDS/UL (ref 149–390)
PMV BLD AUTO: 11.1 FL (ref 8.9–12.7)
POTASSIUM SERPL-SCNC: 3.8 MMOL/L (ref 3.5–5.3)
RBC # BLD AUTO: 5.14 MILLION/UL (ref 3.88–5.62)
SODIUM SERPL-SCNC: 136 MMOL/L (ref 135–147)
WBC # BLD AUTO: 8.75 THOUSAND/UL (ref 4.31–10.16)

## 2024-02-25 PROCEDURE — 85025 COMPLETE CBC W/AUTO DIFF WBC: CPT

## 2024-02-25 PROCEDURE — 84100 ASSAY OF PHOSPHORUS: CPT

## 2024-02-25 PROCEDURE — 83735 ASSAY OF MAGNESIUM: CPT

## 2024-02-25 PROCEDURE — 80048 BASIC METABOLIC PNL TOTAL CA: CPT

## 2024-02-25 PROCEDURE — 99232 SBSQ HOSP IP/OBS MODERATE 35: CPT | Performed by: SURGERY

## 2024-02-25 PROCEDURE — 74018 RADEX ABDOMEN 1 VIEW: CPT

## 2024-02-25 RX ORDER — ENOXAPARIN SODIUM 100 MG/ML
40 INJECTION SUBCUTANEOUS
Status: DISCONTINUED | OUTPATIENT
Start: 2024-02-25 | End: 2024-02-27 | Stop reason: HOSPADM

## 2024-02-25 RX ORDER — ATENOLOL 50 MG/1
50 TABLET ORAL DAILY
Status: DISCONTINUED | OUTPATIENT
Start: 2024-02-26 | End: 2024-02-27 | Stop reason: HOSPADM

## 2024-02-25 RX ORDER — PRAVASTATIN SODIUM 40 MG
40 TABLET ORAL
Status: DISCONTINUED | OUTPATIENT
Start: 2024-02-25 | End: 2024-02-27 | Stop reason: HOSPADM

## 2024-02-25 RX ADMIN — DEXTROSE, SODIUM CHLORIDE, AND POTASSIUM CHLORIDE 125 ML/HR: 5; .45; .15 INJECTION INTRAVENOUS at 03:58

## 2024-02-25 RX ADMIN — ACETAMINOPHEN 1000 MG: 10 INJECTION INTRAVENOUS at 16:55

## 2024-02-25 RX ADMIN — PRAVASTATIN SODIUM 40 MG: 40 TABLET ORAL at 16:55

## 2024-02-25 RX ADMIN — METOROPROLOL TARTRATE 5 MG: 5 INJECTION, SOLUTION INTRAVENOUS at 03:08

## 2024-02-25 RX ADMIN — ACETAMINOPHEN 1000 MG: 10 INJECTION INTRAVENOUS at 02:00

## 2024-02-25 RX ADMIN — HEPARIN SODIUM 5000 UNITS: 5000 INJECTION INTRAVENOUS; SUBCUTANEOUS at 05:01

## 2024-02-25 RX ADMIN — LIDOCAINE 5% 1 PATCH: 700 PATCH TOPICAL at 20:44

## 2024-02-25 RX ADMIN — DEXTROSE, SODIUM CHLORIDE, AND POTASSIUM CHLORIDE 75 ML/HR: 5; .45; .15 INJECTION INTRAVENOUS at 20:44

## 2024-02-25 RX ADMIN — METOROPROLOL TARTRATE 5 MG: 5 INJECTION, SOLUTION INTRAVENOUS at 10:47

## 2024-02-25 RX ADMIN — ACETAMINOPHEN 1000 MG: 10 INJECTION INTRAVENOUS at 09:36

## 2024-02-25 NOTE — PLAN OF CARE
Problem: PAIN - ADULT  Goal: Verbalizes/displays adequate comfort level or baseline comfort level  Description: Interventions:  - Encourage patient to monitor pain and request assistance  - Assess pain using appropriate pain scale  - Administer analgesics based on type and severity of pain and evaluate response  - Implement non-pharmacological measures as appropriate and evaluate response  - Consider cultural and social influences on pain and pain management  - Notify physician/advanced practitioner if interventions unsuccessful or patient reports new pain  Outcome: Progressing     Problem: INFECTION - ADULT  Goal: Absence or prevention of progression during hospitalization  Description: INTERVENTIONS:  - Assess and monitor for signs and symptoms of infection  - Monitor lab/diagnostic results  - Monitor all insertion sites, i.e. indwelling lines, tubes, and drains  - Monitor endotracheal if appropriate and nasal secretions for changes in amount and color  - [place] appropriate cooling/warming therapies per order  - Administer medications as ordered  - Instruct and encourage patient and family to use good hand hygiene technique  - Identify and instruct in appropriate isolation precautions for identified infection/condition  Outcome: Progressing     Problem: SAFETY ADULT  Goal: Patient will remain free of falls  Description: INTERVENTIONS:  - Educate patient/family on patient safety including physical limitations  - Instruct patient to call for assistance with activity   - Consult OT/PT to assist with strengthening/mobility   - Keep Call bell within reach  - Keep bed low and locked with side rails adjusted as appropriate  - Keep care items and personal belongings within reach  - Initiate and maintain comfort rounds  - Make Fall Risk Sign visible to staff  - Apply yellow socks and bracelet for high fall risk patients  - Consider moving patient to room near nurses station  Outcome: Progressing  Goal: Maintain or  return to baseline ADL function  Description: INTERVENTIONS:  -  Assess patient's ability to carry out ADLs; assess patient's baseline for ADL function and identify physical deficits which impact ability to perform ADLs (bathing, care of mouth/teeth, toileting, grooming, dressing, etc.)  - Assess/evaluate cause of self-care deficits   - Assess range of motion  - Assess patient's mobility; develop plan if impaired  - Assess patient's need for assistive devices and provide as appropriate  - Encourage maximum independence but intervene and supervise when necessary  - Involve family in performance of ADLs  - Assess for home care needs following discharge   - Consider OT consult to assist with ADL evaluation and planning for discharge  - Provide patient education as appropriate  Outcome: Progressing  Goal: Maintains/Returns to pre admission functional level  Description: INTERVENTIONS:  - Perform AM-PAC 6 Click Basic Mobility/ Daily Activity assessment daily.  - Set and communicate daily mobility goal to care team and patient/family/caregiver.   - Collaborate with rehabilitation services on mobility goals if consulted  - Perform Range of Motion 3 times a day.  - Reposition patient every 3 hours.  - Dangle patient 3 times a day  - Stand patient 3 times a day  - Ambulate patient 3 times a day  - Out of bed to chair 3 times a day   - Out of bed for meals 3 times a day  - Out of bed for toileting  - Record patient progress and toleration of activity level   Outcome: Progressing     Problem: DISCHARGE PLANNING  Goal: Discharge to home or other facility with appropriate resources  Description: INTERVENTIONS:  - Identify barriers to discharge w/patient and caregiver  - Arrange for needed discharge resources and transportation as appropriate  - Identify discharge learning needs (meds, wound care, etc.)  - Arrange for interpretive services to assist at discharge as needed  - Refer to Case Management Department for coordinating  discharge planning if the patient needs post-hospital services based on physician/advanced practitioner order or complex needs related to functional status, cognitive ability, or social support system  Outcome: Progressing     Problem: GASTROINTESTINAL - ADULT  Goal: Minimal or absence of nausea and/or vomiting  Description: INTERVENTIONS:  - Administer IV fluids if ordered to ensure adequate hydration  - Maintain NPO status until nausea and vomiting are resolved  - Nasogastric tube if ordered  - Administer ordered antiemetic medications as needed  - Provide nonpharmacologic comfort measures as appropriate  - Advance diet as tolerated, if ordered  - Consider nutrition services referral to assist patient with adequate nutrition and appropriate food choices  Outcome: Progressing  Goal: Maintains or returns to baseline bowel function  Description: INTERVENTIONS:  - Assess bowel function  - Encourage oral fluids to ensure adequate hydration  - Administer IV fluids if ordered to ensure adequate hydration  - Administer ordered medications as needed  - Encourage mobilization and activity  - Consider nutritional services referral to assist patient with adequate nutrition and appropriate food choices  Outcome: Progressing  Goal: Maintains adequate nutritional intake  Description: INTERVENTIONS:  - Monitor percentage of each meal consumed  - Identify factors contributing to decreased intake, treat as appropriate  - Assist with meals as needed  - Monitor I&O, weight, and lab values if indicated  - Obtain nutrition services referral as needed  Outcome: Progressing
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
## 2024-02-25 NOTE — PLAN OF CARE
Problem: PAIN - ADULT  Goal: Verbalizes/displays adequate comfort level or baseline comfort level  Description: Interventions:  - Encourage patient to monitor pain and request assistance  - Assess pain using appropriate pain scale  - Administer analgesics based on type and severity of pain and evaluate response  - Implement non-pharmacological measures as appropriate and evaluate response  - Consider cultural and social influences on pain and pain management  - Notify physician/advanced practitioner if interventions unsuccessful or patient reports new pain  Outcome: Progressing     Problem: INFECTION - ADULT  Goal: Absence or prevention of progression during hospitalization  Description: INTERVENTIONS:  - Assess and monitor for signs and symptoms of infection  - Monitor lab/diagnostic results  - Monitor all insertion sites, i.e. indwelling lines, tubes, and drains  - Monitor endotracheal if appropriate and nasal secretions for changes in amount and color  - [place] appropriate cooling/warming therapies per order  - Administer medications as ordered  - Instruct and encourage patient and family to use good hand hygiene technique  - Identify and instruct in appropriate isolation precautions for identified infection/condition  Outcome: Progressing     Problem: SAFETY ADULT  Goal: Patient will remain free of falls  Description: INTERVENTIONS:  - Educate patient/family on patient safety including physical limitations  - Instruct patient to call for assistance with activity   - Consult OT/PT to assist with strengthening/mobility   - Keep Call bell within reach  - Keep bed low and locked with side rails adjusted as appropriate  - Keep care items and personal belongings within reach  - Initiate and maintain comfort rounds  - Make Fall Risk Sign visible to staff  - Apply yellow socks and bracelet for high fall risk patients  - Consider moving patient to room near nurses station  Outcome: Progressing  Goal: Maintain or  return to baseline ADL function  Description: INTERVENTIONS:  -  Assess patient's ability to carry out ADLs; assess patient's baseline for ADL function and identify physical deficits which impact ability to perform ADLs (bathing, care of mouth/teeth, toileting, grooming, dressing, etc.)  - Assess/evaluate cause of self-care deficits   - Assess range of motion  - Assess patient's mobility; develop plan if impaired  - Assess patient's need for assistive devices and provide as appropriate  - Encourage maximum independence but intervene and supervise when necessary  - Involve family in performance of ADLs  - Assess for home care needs following discharge   - Consider OT consult to assist with ADL evaluation and planning for discharge  - Provide patient education as appropriate  Outcome: Progressing  Goal: Maintains/Returns to pre admission functional level  Description: INTERVENTIONS:  - Perform AM-PAC 6 Click Basic Mobility/ Daily Activity assessment daily.  - Set and communicate daily mobility goal to care team and patient/family/caregiver.   - Collaborate with rehabilitation services on mobility goals if consulted  - Perform Range of Motion 3 times a day.  - Reposition patient every 3 hours.  - Dangle patient 3 times a day  - Stand patient 3 times a day  - Ambulate patient 3 times a day  - Out of bed to chair 3 times a day   - Out of bed for meals 3 times a day  - Out of bed for toileting  - Record patient progress and toleration of activity level   Outcome: Progressing     Problem: DISCHARGE PLANNING  Goal: Discharge to home or other facility with appropriate resources  Description: INTERVENTIONS:  - Identify barriers to discharge w/patient and caregiver  - Arrange for needed discharge resources and transportation as appropriate  - Identify discharge learning needs (meds, wound care, etc.)  - Arrange for interpretive services to assist at discharge as needed  - Refer to Case Management Department for coordinating  discharge planning if the patient needs post-hospital services based on physician/advanced practitioner order or complex needs related to functional status, cognitive ability, or social support system  Outcome: Progressing     Problem: GASTROINTESTINAL - ADULT  Goal: Minimal or absence of nausea and/or vomiting  Description: INTERVENTIONS:  - Administer IV fluids if ordered to ensure adequate hydration  - Maintain NPO status until nausea and vomiting are resolved  - Nasogastric tube if ordered  - Administer ordered antiemetic medications as needed  - Provide nonpharmacologic comfort measures as appropriate  - Advance diet as tolerated, if ordered  - Consider nutrition services referral to assist patient with adequate nutrition and appropriate food choices  Outcome: Progressing  Goal: Maintains or returns to baseline bowel function  Description: INTERVENTIONS:  - Assess bowel function  - Encourage oral fluids to ensure adequate hydration  - Administer IV fluids if ordered to ensure adequate hydration  - Administer ordered medications as needed  - Encourage mobilization and activity  - Consider nutritional services referral to assist patient with adequate nutrition and appropriate food choices  Outcome: Progressing  Goal: Maintains adequate nutritional intake  Description: INTERVENTIONS:  - Monitor percentage of each meal consumed  - Identify factors contributing to decreased intake, treat as appropriate  - Assist with meals as needed  - Monitor I&O, weight, and lab values if indicated  - Obtain nutrition services referral as needed  Outcome: Progressing

## 2024-02-26 PROCEDURE — 99232 SBSQ HOSP IP/OBS MODERATE 35: CPT | Performed by: PHYSICIAN ASSISTANT

## 2024-02-26 RX ORDER — FAMOTIDINE 20 MG/1
20 TABLET, FILM COATED ORAL 2 TIMES DAILY
Status: DISCONTINUED | OUTPATIENT
Start: 2024-02-26 | End: 2024-02-27 | Stop reason: HOSPADM

## 2024-02-26 RX ORDER — OXYCODONE HYDROCHLORIDE 5 MG/1
5 TABLET ORAL EVERY 4 HOURS PRN
Status: DISCONTINUED | OUTPATIENT
Start: 2024-02-26 | End: 2024-02-27 | Stop reason: HOSPADM

## 2024-02-26 RX ORDER — FAMOTIDINE 10 MG/ML
20 INJECTION, SOLUTION INTRAVENOUS EVERY 12 HOURS SCHEDULED
Status: DISCONTINUED | OUTPATIENT
Start: 2024-02-26 | End: 2024-02-26

## 2024-02-26 RX ORDER — ACETAMINOPHEN 325 MG/1
975 TABLET ORAL EVERY 8 HOURS PRN
Status: DISCONTINUED | OUTPATIENT
Start: 2024-02-26 | End: 2024-02-27 | Stop reason: HOSPADM

## 2024-02-26 RX ADMIN — PRAVASTATIN SODIUM 40 MG: 40 TABLET ORAL at 16:33

## 2024-02-26 RX ADMIN — ATENOLOL 50 MG: 50 TABLET ORAL at 08:53

## 2024-02-26 RX ADMIN — ONDANSETRON 4 MG: 2 INJECTION INTRAMUSCULAR; INTRAVENOUS at 15:54

## 2024-02-26 RX ADMIN — FAMOTIDINE 20 MG: 20 TABLET ORAL at 21:52

## 2024-02-26 RX ADMIN — FAMOTIDINE 20 MG: 10 INJECTION INTRAVENOUS at 16:32

## 2024-02-26 RX ADMIN — LIDOCAINE 5% 1 PATCH: 700 PATCH TOPICAL at 21:52

## 2024-02-26 NOTE — PLAN OF CARE
Problem: PAIN - ADULT  Goal: Verbalizes/displays adequate comfort level or baseline comfort level  Description: Interventions:  - Encourage patient to monitor pain and request assistance  - Assess pain using appropriate pain scale  - Administer analgesics based on type and severity of pain and evaluate response  - Implement non-pharmacological measures as appropriate and evaluate response  - Consider cultural and social influences on pain and pain management  - Notify physician/advanced practitioner if interventions unsuccessful or patient reports new pain  Outcome: Progressing     Problem: INFECTION - ADULT  Goal: Absence or prevention of progression during hospitalization  Description: INTERVENTIONS:  - Assess and monitor for signs and symptoms of infection  - Monitor lab/diagnostic results  - Monitor all insertion sites, i.e. indwelling lines, tubes, and drains  - Monitor endotracheal if appropriate and nasal secretions for changes in amount and color  - [place] appropriate cooling/warming therapies per order  - Administer medications as ordered  - Instruct and encourage patient and family to use good hand hygiene technique  - Identify and instruct in appropriate isolation precautions for identified infection/condition  Outcome: Progressing     Problem: SAFETY ADULT  Goal: Patient will remain free of falls  Description: INTERVENTIONS:  - Educate patient/family on patient safety including physical limitations  - Instruct patient to call for assistance with activity   - Consult OT/PT to assist with strengthening/mobility   - Keep Call bell within reach  - Keep bed low and locked with side rails adjusted as appropriate  - Keep care items and personal belongings within reach  - Initiate and maintain comfort rounds  - Make Fall Risk Sign visible to staff  - Apply yellow socks and bracelet for high fall risk patients  - Consider moving patient to room near nurses station  Outcome: Progressing  Goal: Maintain or  return to baseline ADL function  Description: INTERVENTIONS:  -  Assess patient's ability to carry out ADLs; assess patient's baseline for ADL function and identify physical deficits which impact ability to perform ADLs (bathing, care of mouth/teeth, toileting, grooming, dressing, etc.)  - Assess/evaluate cause of self-care deficits   - Assess range of motion  - Assess patient's mobility; develop plan if impaired  - Assess patient's need for assistive devices and provide as appropriate  - Encourage maximum independence but intervene and supervise when necessary  - Involve family in performance of ADLs  - Assess for home care needs following discharge   - Consider OT consult to assist with ADL evaluation and planning for discharge  - Provide patient education as appropriate  Outcome: Progressing  Goal: Maintains/Returns to pre admission functional level  Description: INTERVENTIONS:  - Perform AM-PAC 6 Click Basic Mobility/ Daily Activity assessment daily.  - Set and communicate daily mobility goal to care team and patient/family/caregiver.   - Collaborate with rehabilitation services on mobility goals if consulted  - Perform Range of Motion 3 times a day.  - Reposition patient every 3 hours.  - Dangle patient 3 times a day  - Stand patient 3 times a day  - Ambulate patient 3 times a day  - Out of bed to chair 3 times a day   - Out of bed for meals 3 times a day  - Out of bed for toileting  - Record patient progress and toleration of activity level   Outcome: Progressing     Problem: DISCHARGE PLANNING  Goal: Discharge to home or other facility with appropriate resources  Description: INTERVENTIONS:  - Identify barriers to discharge w/patient and caregiver  - Arrange for needed discharge resources and transportation as appropriate  - Identify discharge learning needs (meds, wound care, etc.)  - Arrange for interpretive services to assist at discharge as needed  - Refer to Case Management Department for coordinating  discharge planning if the patient needs post-hospital services based on physician/advanced practitioner order or complex needs related to functional status, cognitive ability, or social support system  Outcome: Progressing     Problem: GASTROINTESTINAL - ADULT  Goal: Minimal or absence of nausea and/or vomiting  Description: INTERVENTIONS:  - Administer IV fluids if ordered to ensure adequate hydration  - Maintain NPO status until nausea and vomiting are resolved  - Nasogastric tube if ordered  - Administer ordered antiemetic medications as needed  - Provide nonpharmacologic comfort measures as appropriate  - Advance diet as tolerated, if ordered  - Consider nutrition services referral to assist patient with adequate nutrition and appropriate food choices  Outcome: Progressing  Goal: Maintains or returns to baseline bowel function  Description: INTERVENTIONS:  - Assess bowel function  - Encourage oral fluids to ensure adequate hydration  - Administer IV fluids if ordered to ensure adequate hydration  - Administer ordered medications as needed  - Encourage mobilization and activity  - Consider nutritional services referral to assist patient with adequate nutrition and appropriate food choices  Outcome: Progressing  Goal: Maintains adequate nutritional intake  Description: INTERVENTIONS:  - Monitor percentage of each meal consumed  - Identify factors contributing to decreased intake, treat as appropriate  - Assist with meals as needed  - Monitor I&O, weight, and lab values if indicated  - Obtain nutrition services referral as needed  Outcome: Progressing
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
## 2024-02-26 NOTE — CASE MANAGEMENT
Case Management Assessment & Discharge Planning Note    Patient name Eagle Nava  Location East 5 /E5 -* MRN [id number]  : 1960 Date 2024       Current Admission Date: 2024  Current Admission Diagnosis:SBO (small bowel obstruction) (HCC)   Patient Active Problem List    Diagnosis Date Noted    SBO (small bowel obstruction) (HCC) 2024    Coronary artery disease 2024    HLD (hyperlipidemia) 2024    Neurocardiogenic syncope 2024    Obstructive sleep apnea 2021      LOS (days): 3  Geometric Mean LOS (GMLOS) (days):   Days to GMLOS:     OBJECTIVE:    Risk of Unplanned Readmission Score: 8.02         Current admission status: Inpatient       Preferred Pharmacy:   [facility]/pharmacy #[id number]  [place] - [address]  [address] [place] [address]  Phone: [phone] Fax: [phone]    [facility]/pharmacy #[id number] - [place] - [address]  [address]  [facility] [address]  Phone: [phone] Fax: [phone]    Primary Care Provider: No primary care provider on file.    Primary Insurance: [facility]  Secondary Insurance:     ASSESSMENT:  Active Health Care Proxies    There are no active Health Care Proxies on file.       Advance Directives  Does patient have a Health Care POA?: No  Was patient offered paperwork?: Yes  Does patient currently have a Health Care decision maker?: Yes, please see Health Care Proxy section  Does patient have Advance Directives?: No  Was patient offered paperwork?: Yes  Primary Contact: JERRY NAVA (Spouse)  [phone] (Mobile)         Readmission Root Cause  30 Day Readmission: No    Patient Information  Admitted from:: Home  Mental Status: Alert  During Assessment patient was accompanied by: Not accompanied during assessment  Assessment information provided by:: Patient  Primary Caregiver: Spouse  Caregiver's Name:: JERRY NAVA (Spouse)  [phone] (Mobile)  Caregiver's  Relationship to Patient:: Family Member  Caregiver's Telephone Number:: JERRY SIEGEL (Spouse)  [phone] (Mobile)  Support Systems: Self, Spouse/significant other  County of Residence: [place]  What city do you live in?: [place]  Home entry access options. Select all that apply.: Stairs  Number of steps to enter home.: 5  Do the steps have railings?: Yes  Type of Current Residence: 2 story home  Upon entering residence, is there a bedroom on the main floor (no further steps)?: Yes  Upon entering residence, is there a bathroom on the main floor (no further steps)?: Yes  Living Arrangements: Lives w/ Spouse/significant other  Is patient a ?: Yes  Is patient active with [facility])?: Yes  Is patient service connected?: Yes    Activities of Daily Living Prior to Admission  Functional Status: Independent  Completes ADLs independently?: Yes  Ambulates independently?: Yes  Does patient use assisted devices?: No  Does patient currently own DME?: No  Does patient have a history of Outpatient Therapy (PT/OT)?: No  Does the patient have a history of Short-Term Rehab?: No  Does patient have a history of HHC?: No  Does patient currently have HHC?: No         Patient Information Continued  Income Source: Pension/senior living  Does patient have prescription coverage?: Yes  Does patient receive dialysis treatments?: No  Does patient have a history of substance abuse?: No  Does patient have a history of Mental Health Diagnosis?: No    PHQ 2/9 Screening   Reviewed PHQ 2/9 Depression Screening Score?: No    Means of Transportation  Means of Transport to Appts:: Drives Self      Social Determinants of Health (SDOH)      Flowsheet Row Most Recent Value   Housing Stability    In the last 12 months, was there a time when you were not able to pay the mortgage or rent on time? N   In the last 12 months, was there a time when you did not have a steady place to sleep or slept in a shelter (including now)? N   Transportation  Needs    In the past 12 months, has lack of transportation kept you from medical appointments or from getting medications? no   In the past 12 months, has lack of transportation kept you from meetings, work, or from getting things needed for daily living? No   Food Insecurity    Within the past 12 months, you worried that your food would run out before you got the money to buy more. Never true   Within the past 12 months, the food you bought just didn't last and you didn't have money to get more. Never true   Utilities    In the past 12 months has the electric, gas, oil, or water company threatened to shut off services in your home? No            DISCHARGE DETAILS:    Discharge planning discussed with:: Patient  Freedom of Choice: Yes  Comments - Freedom of Choice: TBD  CM contacted family/caregiver?: Yes  Were Treatment Team discharge recommendations reviewed with patient/caregiver?: Yes  Did patient/caregiver verbalize understanding of patient care needs?: Yes  Were patient/caregiver advised of the risks associated with not following Treatment Team discharge recommendations?: Yes    Contacts  Patient Contacts: JERRY SIEGEL (Spouse)  [phone] (Mobile)  Relationship to Patient:: Family  Contact Method: Phone  Phone Number: JERRY SIEGEL (Spouse)  [phone] (Mobile)  Reason/Outcome: Discharge Planning          Would you like to participate in our [facility] service program?  : Yes    Treatment Team Recommendation: Home      Additional Comments: CM spoke with patient and comfirmed all info on facesheet is correct. Lives with spouse in a 2 story home with a bed and bath available on the 1st floor. Reports being IND with ADLS and ambulation. Denies any DME, home O2. HHC or HD services. States he is active with [employer] services. States his spouse will transport home at d/c. CM to continue to follow for any anticipated d/c needs.

## 2024-02-26 NOTE — APP STUDENT NOTE
"[facility] Internal Medicine Progress Note  Patient: Eagle Nava 63 y.o. male   MRN: [id number]  PCP: No primary care provider on file.  Unit/Bed#: E5 -01 Encounter: [id number]  Date Of Visit: 02/26/24    Assessment:    Principal Problem:    SBO (small bowel obstruction) (HCC)  Active Problems:    Obstructive sleep apnea    Coronary artery disease    HLD (hyperlipidemia)    Neurocardiogenic syncope      Plan:    Small bowel obstruction  Presented to the ED with complaint of distended abdomen with nausea and vomiting. PMH of multiple SBO and prev. ex. lap for previous episodes.   CT abdomen/pelvis with contrast showed evidence of \"small bowel obstruction with transition of caliber in the right hemiabdomen.\"  Gen. Surgery was consulted and originally placed an NG tube and started IV fluids.  NG tube was removed yesterday.  Today recommendation is to advance to a low residual diet and discontinue IV fluids.   Analgesia as needed.   Dialudid 0.2mg Q4h PRN  Dialaudid 0.5mg Q4h PRN   Antiemetics as needed.  Zofran 4mg Q4h PRN   Promethazine 12.5mg Q6h PRN   Nonobstructive coronary artery disease  Assessment and Plan:  Stenosis of LAD from prev. cath. Managed on Aspirin 81mg.   On lovenox 40mg Q24h ISABEL for DVT prophylaxis.    Hyperlipidemia   Continue on pravastatin 04mg PO daily.   Neurocardiogenic syncope   Outpt management with atenolol.   Continue atenolol 50mg PO daily.        VTE Pharmacologic Prophylaxis:     Moderate Risk (Score 3-4) - Pharmacological DVT Prophylaxis Ordered: Enoxaparin (Lovenox).      Current Length of Stay: 3 day(s)  Current Patient Status: Inpatient       Code Status: Level 1 - Full Code      Subjective:   Patient seen and examined. Pleasant, sitting in chair eating small bites of breakfast. Not complaining of any abdominal pain, chest pain, SOB or trouble breathing. No nausea or vomiting.  Had 2 bowel movements yesterday and 1 more this morning. Passed gas this morning prior to bowel " movement. States he has to take little bits of breakfast at a time, but so far tolerating well. Drinking is going well without complaint or difficulty. States he feels fatigued, but knows this is what is to be expected.   Did state that he usually takes 600mg gabapentin PO daily at bedtime, has been managed with lidocaine 5% patch inpt. Patient is concerned for any side effects from stopping the gabapentin. States he is not in any acute pain and if it is safe, would like to continue to not use the gabapentin if it's not needed.    Objective:     Vitals:   Temp (24hrs), Av.6 °F (37 °C), Min:98.5 °F (36.9 °C), Max:98.7 °F (37.1 °C)    Temp:  [98.5 °F (36.9 °C)-98.7 °F (37.1 °C)] 98.7 °F (37.1 °C)  HR:  [65-76] 76  Resp:  [18-19] 19  BP: (129-155)/(79-95) 152/95  SpO2:  [96 %] 96 %  Body mass index is 30.93 kg/m².     Input and Output Summary (last 24 hours):       Intake/Output Summary (Last 24 hours) at 2024 0940  Last data filed at 2024 0233  Gross per 24 hour   Intake --   Output 976 ml   Net -976 ml       Physical Exam:   Physical Exam  Constitutional:       Appearance: Normal appearance.   Cardiovascular:      Rate and Rhythm: Normal rate and regular rhythm.      Pulses: Normal pulses.      Heart sounds: Normal heart sounds.   Pulmonary:      Effort: Pulmonary effort is normal.      Breath sounds: Normal breath sounds.   Abdominal:      General: Bowel sounds are normal. There is distension.      Tenderness: There is no abdominal tenderness.   Neurological:      General: No focal deficit present.      Mental Status: He is alert and oriented to person, place, and time.          Additional Data:     Labs:  Results from last 7 days   Lab Units 24  0453   WBC Thousand/uL 8.75   HEMOGLOBIN g/dL 15.5   HEMATOCRIT % 45.4   PLATELETS Thousands/uL 177   NEUTROS PCT % 64   LYMPHS PCT % 24   MONOS PCT % 10   EOS PCT % 1     Results from last 7 days   Lab Units 24  0453 24  0431   SODIUM mmol/L  136 139   POTASSIUM mmol/L 3.8 3.9   CHLORIDE mmol/L 104 104   CO2 mmol/L 26 27   BUN mg/dL 10 13   CREATININE mg/dL 0.92 1.07   ANION GAP mmol/L 6 8   CALCIUM mg/dL 8.2* 8.6   ALBUMIN g/dL  --  3.7   TOTAL BILIRUBIN mg/dL  --  1.38*   ALK PHOS U/L  --  80   ALT U/L  --  25   AST U/L  --  19   GLUCOSE RANDOM mg/dL 119 110     Results from last 7 days   Lab Units 02/23/24  1209   INR  0.96             Results from last 7 days   Lab Units 02/23/24  1209   LACTIC ACID mmol/L 1.3       Imaging: Reviewed radiology reports from this admission including: abdominal/pelvic CT scan    Recent Cultures (last 7 days):           Lines/Drains:  Invasive Devices       Peripheral Intravenous Line  Duration             Peripheral IV 02/23/24 Distal;Left;Upper;Ventral (anterior) Arm 2 days                    Telemetry:        Last 24 Hours Medication List:   Current Facility-Administered Medications   Medication Dose Route Frequency Provider Last Rate    acetaminophen  1,000 mg Intravenous Q8H Akash Conde MD 1,000 mg (02/25/24 1655)    atenolol  50 mg Oral Daily Connor Lakhani MD      enoxaparin  40 mg Subcutaneous Q24H [facility] Akash Conde MD      HYDROmorphone  0.5 mg Intravenous Q4H PRN Akash Conde MD      HYDROmorphone  0.2 mg Intravenous Q4H PRN Akash Conde MD      HYDROmorphone  0.2 mg Intravenous Q4H PRN Akash Conde MD      lidocaine  1 patch Topical Daily Edinson Amaya MD      ondansetron  4 mg Intravenous Q4H PRN Akash Conde MD      pravastatin  40 mg Oral Daily With Dinner Connor Lakhani MD      promethazine  12.5 mg Intramuscular Q6H PRN Akash Conde MD          Today, Patient Was Seen By: Ene Mckeon    ** Please Note: This note has been constructed using a voice recognition system. **

## 2024-02-26 NOTE — PROGRESS NOTES
Progress Note - General Surgery   Eagle Nava 63 y.o. male MRN: [id number]  Unit/Bed#: E5 -01 Encounter: [id number]    Assessment:  63-year-old male with hx ex lap and multiple SBO's treated conservatively who presented with repeat SBO which appears to be resolving    Plan:  -Advance to low residue diet  -Discontinue IV fluids  -Appreciate Slim recommendation  -If patient tolerating diet advancement may DC later  -Dispo planning  -DVT prophylaxis  -Analgesia as needed  -Antiemetics as needed      Subjective/Objective     Subjective: No acute events overnight.  No nausea or vomiting.  Has been tolerating clear liquid diet well without issue.  He has passed flatus and had multiple bowel movements at this time.    Objective: AVSS on room air    Blood pressure 152/95, pulse 74, temperature 98.7 °F (37.1 °C), temperature source Oral, resp. rate 19, weight 101 kg (221 lb 12.5 oz), SpO2 96%.,Body mass index is 30.93 kg/m².    UOP: 975 cc    Invasive Devices       Peripheral Intravenous Line  Duration             Peripheral IV 02/23/24 Distal;Left;Upper;Ventral (anterior) Arm 2 days                    Physical Exam:  General: No acute distress, alert and oriented  CV: RRR  Lungs: Normal work of breathing   Abdomen: Soft, minimally distended, nontender  Skin: Warm, dry    Lab, Imaging and other studies:  Recent Labs     02/23/24  1209 02/24/24  0431 02/25/24  0453   WBC 8.54 9.37 8.75   HGB 17.7* 15.7 15.5    191 177   SODIUM 139 139 136   K 3.6 3.9 3.8    104 104   CO2 30 27 26   BUN 13 13 10   CREATININE 0.95 1.07 0.92   GLUC 131 110 119   CALCIUM 9.7 8.6 8.2*   MG 2.1 2.1 2.1   PHOS  --   --  2.9   AST 24 19  --    ALT 37 25  --    ALKPHOS 102 80  --    TBILI 1.35* 1.38*  --

## 2024-02-26 NOTE — RESTORATIVE TECHNICIAN NOTE
Restorative Technician Note      Patient Name: Eagle Nava     Restorative Tech Visit Date: 02/26/24  Note Type: Mobility  Patient Position Upon Consult: Supine  Activity Performed: Range of motion  Patient Position at End of Consult: Supine; All needs within reach
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
## 2024-02-26 NOTE — PLAN OF CARE
Problem: PAIN - ADULT  Goal: Verbalizes/displays adequate comfort level or baseline comfort level  Description: Interventions:  - Encourage patient to monitor pain and request assistance  - Assess pain using appropriate pain scale  - Administer analgesics based on type and severity of pain and evaluate response  - Implement non-pharmacological measures as appropriate and evaluate response  - Consider cultural and social influences on pain and pain management  - Notify physician/advanced practitioner if interventions unsuccessful or patient reports new pain  Outcome: Progressing     Problem: INFECTION - ADULT  Goal: Absence or prevention of progression during hospitalization  Description: INTERVENTIONS:  - Assess and monitor for signs and symptoms of infection  - Monitor lab/diagnostic results  - Monitor all insertion sites, i.e. indwelling lines, tubes, and drains  - Monitor endotracheal if appropriate and nasal secretions for changes in amount and color  - [place] appropriate cooling/warming therapies per order  - Administer medications as ordered  - Instruct and encourage patient and family to use good hand hygiene technique  - Identify and instruct in appropriate isolation precautions for identified infection/condition  Outcome: Progressing     Problem: SAFETY ADULT  Goal: Patient will remain free of falls  Description: INTERVENTIONS:  - Educate patient/family on patient safety including physical limitations  - Instruct patient to call for assistance with activity   - Consult OT/PT to assist with strengthening/mobility   - Keep Call bell within reach  - Keep bed low and locked with side rails adjusted as appropriate  - Keep care items and personal belongings within reach  - Initiate and maintain comfort rounds  - Make Fall Risk Sign visible to staff  - Apply yellow socks and bracelet for high fall risk patients  - Consider moving patient to room near nurses station  Outcome: Progressing  Goal: Maintain or  return to baseline ADL function  Description: INTERVENTIONS:  -  Assess patient's ability to carry out ADLs; assess patient's baseline for ADL function and identify physical deficits which impact ability to perform ADLs (bathing, care of mouth/teeth, toileting, grooming, dressing, etc.)  - Assess/evaluate cause of self-care deficits   - Assess range of motion  - Assess patient's mobility; develop plan if impaired  - Assess patient's need for assistive devices and provide as appropriate  - Encourage maximum independence but intervene and supervise when necessary  - Involve family in performance of ADLs  - Assess for home care needs following discharge   - Consider OT consult to assist with ADL evaluation and planning for discharge  - Provide patient education as appropriate  Outcome: Progressing  Goal: Maintains/Returns to pre admission functional level  Description: INTERVENTIONS:  - Perform AM-PAC 6 Click Basic Mobility/ Daily Activity assessment daily.  - Set and communicate daily mobility goal to care team and patient/family/caregiver.   - Collaborate with rehabilitation services on mobility goals if consulted  - Perform Range of Motion 3 times a day.  - Reposition patient every 3 hours.  - Dangle patient 3 times a day  - Stand patient 3 times a day  - Ambulate patient 3 times a day  - Out of bed to chair 3 times a day   - Out of bed for meals 3 times a day  - Out of bed for toileting  - Record patient progress and toleration of activity level   Outcome: Progressing     Problem: DISCHARGE PLANNING  Goal: Discharge to home or other facility with appropriate resources  Description: INTERVENTIONS:  - Identify barriers to discharge w/patient and caregiver  - Arrange for needed discharge resources and transportation as appropriate  - Identify discharge learning needs (meds, wound care, etc.)  - Arrange for interpretive services to assist at discharge as needed  - Refer to Case Management Department for coordinating  discharge planning if the patient needs post-hospital services based on physician/advanced practitioner order or complex needs related to functional status, cognitive ability, or social support system  Outcome: Progressing     Problem: GASTROINTESTINAL - ADULT  Goal: Minimal or absence of nausea and/or vomiting  Description: INTERVENTIONS:  - Administer IV fluids if ordered to ensure adequate hydration  - Maintain NPO status until nausea and vomiting are resolved  - Nasogastric tube if ordered  - Administer ordered antiemetic medications as needed  - Provide nonpharmacologic comfort measures as appropriate  - Advance diet as tolerated, if ordered  - Consider nutrition services referral to assist patient with adequate nutrition and appropriate food choices  Outcome: Progressing  Goal: Maintains or returns to baseline bowel function  Description: INTERVENTIONS:  - Assess bowel function  - Encourage oral fluids to ensure adequate hydration  - Administer IV fluids if ordered to ensure adequate hydration  - Administer ordered medications as needed  - Encourage mobilization and activity  - Consider nutritional services referral to assist patient with adequate nutrition and appropriate food choices  Outcome: Progressing  Goal: Maintains adequate nutritional intake  Description: INTERVENTIONS:  - Monitor percentage of each meal consumed  - Identify factors contributing to decreased intake, treat as appropriate  - Assist with meals as needed  - Monitor I&O, weight, and lab values if indicated  - Obtain nutrition services referral as needed  Outcome: Progressing     Problem: Nutrition/Hydration-ADULT  Goal: Nutrient/Hydration intake appropriate for improving, restoring or maintaining nutritional needs  Description: Monitor and assess patient's nutrition/hydration status for malnutrition. Collaborate with interdisciplinary team and initiate plan and interventions as ordered.  Monitor patient's weight and dietary intake as  ordered or per policy. Utilize nutrition screening tool and intervene as necessary. Determine patient's food preferences and provide high-protein, high-caloric foods as appropriate.     INTERVENTIONS:  - Monitor oral intake, urinary output, labs, and treatment plans  - Assess nutrition and hydration status and recommend course of action  - Evaluate amount of meals eaten  - Assist patient with eating if necessary   - Allow adequate time for meals  - Recommend/ encourage appropriate diets, oral nutritional supplements, and vitamin/mineral supplements  - Order, calculate, and assess calorie counts as needed  - Recommend, monitor, and adjust tube feedings and TPN/PPN based on assessed needs  - Assess need for intravenous fluids  - Provide specific nutrition/hydration education as appropriate  - Include patient/family/caregiver in decisions related to nutrition  Outcome: Progressing

## 2024-02-27 VITALS
TEMPERATURE: 98 F | OXYGEN SATURATION: 94 % | WEIGHT: 221.78 LBS | SYSTOLIC BLOOD PRESSURE: 132 MMHG | HEART RATE: 63 BPM | DIASTOLIC BLOOD PRESSURE: 78 MMHG | BODY MASS INDEX: 30.93 KG/M2 | RESPIRATION RATE: 19 BRPM

## 2024-02-27 PROBLEM — K56.609 SBO (SMALL BOWEL OBSTRUCTION) (HCC): Status: RESOLVED | Noted: 2024-02-24 | Resolved: 2024-02-27

## 2024-02-27 PROCEDURE — 99232 SBSQ HOSP IP/OBS MODERATE 35: CPT | Performed by: SURGERY

## 2024-02-27 PROCEDURE — 99232 SBSQ HOSP IP/OBS MODERATE 35: CPT | Performed by: PHYSICIAN ASSISTANT

## 2024-02-27 RX ADMIN — ATENOLOL 50 MG: 50 TABLET ORAL at 09:15

## 2024-02-27 RX ADMIN — ONDANSETRON 4 MG: 2 INJECTION INTRAMUSCULAR; INTRAVENOUS at 04:01

## 2024-02-27 RX ADMIN — FAMOTIDINE 20 MG: 20 TABLET ORAL at 09:15

## 2024-02-27 NOTE — DISCHARGE INSTR - AVS FIRST PAGE
[facility] Surgical Discharge Instructions      Please follow-up as scheduled.   Activity:  - Regular activity (working around the house, walking up/down stairs, etc.) is ok and encouraged    Diet:    - We recommend continuing a low fiber diet as attached in your after visit summary    Medications:    - Continue your pre-hospital medication regimen after discharge unless your were instructed otherwise. Please refer to your discharge medication list for further details.    Additional Instructions:  - If you have any questions or concerns after discharge please do not hesitate to contact our office, we would be happy to provide clarification      Call our office or return to the Emergency Room if you develop a fever greater than 101F, chills, persistent nausea/vomiting, worsening/uncontrollable pain, and/or increasing redness or purulent/foul smelling drainage from your incision(s)

## 2024-02-27 NOTE — RESTORATIVE TECHNICIAN NOTE
Restorative Technician Note      Patient Name: Eagle Nava     Restorative Tech Visit Date: 02/27/24  Note Type: Mobility  Patient Position Upon Consult: Supine  Activity Performed: Ambulated  Patient Position at End of Consult: Supine; All needs within reach
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
## 2024-02-27 NOTE — PLAN OF CARE
Problem: PAIN - ADULT  Goal: Verbalizes/displays adequate comfort level or baseline comfort level  Description: Interventions:  - Encourage patient to monitor pain and request assistance  - Assess pain using appropriate pain scale  - Administer analgesics based on type and severity of pain and evaluate response  - Implement non-pharmacological measures as appropriate and evaluate response  - Consider cultural and social influences on pain and pain management  - Notify physician/advanced practitioner if interventions unsuccessful or patient reports new pain  Outcome: Progressing     Problem: INFECTION - ADULT  Goal: Absence or prevention of progression during hospitalization  Description: INTERVENTIONS:  - Assess and monitor for signs and symptoms of infection  - Monitor lab/diagnostic results  - Monitor all insertion sites, i.e. indwelling lines, tubes, and drains  - Monitor endotracheal if appropriate and nasal secretions for changes in amount and color  - [place] appropriate cooling/warming therapies per order  - Administer medications as ordered  - Instruct and encourage patient and family to use good hand hygiene technique  - Identify and instruct in appropriate isolation precautions for identified infection/condition  Outcome: Progressing     Problem: SAFETY ADULT  Goal: Patient will remain free of falls  Description: INTERVENTIONS:  - Educate patient/family on patient safety including physical limitations  - Instruct patient to call for assistance with activity   - Consult OT/PT to assist with strengthening/mobility   - Keep Call bell within reach  - Keep bed low and locked with side rails adjusted as appropriate  - Keep care items and personal belongings within reach  - Initiate and maintain comfort rounds  - Make Fall Risk Sign visible to staff  - Apply yellow socks and bracelet for high fall risk patients  - Consider moving patient to room near nurses station  Outcome: Progressing  Goal: Maintain or  return to baseline ADL function  Description: INTERVENTIONS:  -  Assess patient's ability to carry out ADLs; assess patient's baseline for ADL function and identify physical deficits which impact ability to perform ADLs (bathing, care of mouth/teeth, toileting, grooming, dressing, etc.)  - Assess/evaluate cause of self-care deficits   - Assess range of motion  - Assess patient's mobility; develop plan if impaired  - Assess patient's need for assistive devices and provide as appropriate  - Encourage maximum independence but intervene and supervise when necessary  - Involve family in performance of ADLs  - Assess for home care needs following discharge   - Consider OT consult to assist with ADL evaluation and planning for discharge  - Provide patient education as appropriate  Outcome: Progressing  Goal: Maintains/Returns to pre admission functional level  Description: INTERVENTIONS:  - Perform AM-PAC 6 Click Basic Mobility/ Daily Activity assessment daily.  - Set and communicate daily mobility goal to care team and patient/family/caregiver.   - Collaborate with rehabilitation services on mobility goals if consulted  - Perform Range of Motion 3 times a day.  - Reposition patient every 3 hours.  - Dangle patient 3 times a day  - Stand patient 3 times a day  - Ambulate patient 3 times a day  - Out of bed to chair 3 times a day   - Out of bed for meals 3 times a day  - Out of bed for toileting  - Record patient progress and toleration of activity level   Outcome: Progressing     Problem: DISCHARGE PLANNING  Goal: Discharge to home or other facility with appropriate resources  Description: INTERVENTIONS:  - Identify barriers to discharge w/patient and caregiver  - Arrange for needed discharge resources and transportation as appropriate  - Identify discharge learning needs (meds, wound care, etc.)  - Arrange for interpretive services to assist at discharge as needed  - Refer to Case Management Department for coordinating  discharge planning if the patient needs post-hospital services based on physician/advanced practitioner order or complex needs related to functional status, cognitive ability, or social support system  Outcome: Progressing     Problem: GASTROINTESTINAL - ADULT  Goal: Minimal or absence of nausea and/or vomiting  Description: INTERVENTIONS:  - Administer IV fluids if ordered to ensure adequate hydration  - Maintain NPO status until nausea and vomiting are resolved  - Nasogastric tube if ordered  - Administer ordered antiemetic medications as needed  - Provide nonpharmacologic comfort measures as appropriate  - Advance diet as tolerated, if ordered  - Consider nutrition services referral to assist patient with adequate nutrition and appropriate food choices  Outcome: Progressing  Goal: Maintains or returns to baseline bowel function  Description: INTERVENTIONS:  - Assess bowel function  - Encourage oral fluids to ensure adequate hydration  - Administer IV fluids if ordered to ensure adequate hydration  - Administer ordered medications as needed  - Encourage mobilization and activity  - Consider nutritional services referral to assist patient with adequate nutrition and appropriate food choices  Outcome: Progressing  Goal: Maintains adequate nutritional intake  Description: INTERVENTIONS:  - Monitor percentage of each meal consumed  - Identify factors contributing to decreased intake, treat as appropriate  - Assist with meals as needed  - Monitor I&O, weight, and lab values if indicated  - Obtain nutrition services referral as needed  Outcome: Progressing     Problem: Nutrition/Hydration-ADULT  Goal: Nutrient/Hydration intake appropriate for improving, restoring or maintaining nutritional needs  Description: Monitor and assess patient's nutrition/hydration status for malnutrition. Collaborate with interdisciplinary team and initiate plan and interventions as ordered.  Monitor patient's weight and dietary intake as  ordered or per policy. Utilize nutrition screening tool and intervene as necessary. Determine patient's food preferences and provide high-protein, high-caloric foods as appropriate.     INTERVENTIONS:  - Monitor oral intake, urinary output, labs, and treatment plans  - Assess nutrition and hydration status and recommend course of action  - Evaluate amount of meals eaten  - Assist patient with eating if necessary   - Allow adequate time for meals  - Recommend/ encourage appropriate diets, oral nutritional supplements, and vitamin/mineral supplements  - Order, calculate, and assess calorie counts as needed  - Recommend, monitor, and adjust tube feedings and TPN/PPN based on assessed needs  - Assess need for intravenous fluids  - Provide specific nutrition/hydration education as appropriate  - Include patient/family/caregiver in decisions related to nutrition  Outcome: Progressing

## 2024-02-27 NOTE — PROGRESS NOTES
"Progress Note - General Surgery  Eagle Nava 63 y.o. male MRN: [id number]  Unit/Bed#: E5 -01 Encounter: [id number]      Assessment:  63 y.o. male with SBO managed non-operatively, now resolved. Afebrile, HDS.       Plan:  - Diet as tolerated  - PRN analgesia and antiemetics  - DVT ppx  - Encourage OOB and ambulation  - Plan for discharge home today        Subjective: No acute events overnight. Afebrile, hemodynamically stable. Tolerating diet. Nausea yesterday relieved with zofran. No vomiting, fevers or chills. Endorsing bowel movements and flatus. Denies abdominal pain.        Objective:   Temp:  [98 °F (36.7 °C)-98.5 °F (36.9 °C)] 98 °F (36.7 °C)  HR:  [63-76] 63  Resp:  [19] 19  BP: (132-159)/(78-85) 132/78    Physical Exam:  General: No acute distress, alert and oriented  CV: Well perfused, regular rate and rhythm  Lungs: Normal work of breathing, no increased respiratory effort  Abdomen: Soft, non-tender, non-distended, protuberant.  Extremities: No edema, clubbing or cyanosis  Skin: Warm, dry      I/O         02/25 0701 02/26 0700 02/26 0701 02/27 0700 02/27 0701  02/28 0700    P.O.  120     I.V. (mL/kg)       Total Intake(mL/kg)  120 (1.2)     Urine (mL/kg/hr) 975 (0.4)      Emesis/NG output       Stool 1      Total Output 976      Net -976 +120            Unmeasured Stool Occurrence 1 x 1 x             Lab, Imaging and other studies: I have personally reviewed pertinent reports.  , CBC with diff: No results found for: \"WBC\", \"HGB\", \"HCT\", \"MCV\", \"PLT\", \"ADJUSTEDWBC\", \"RBC\", \"MCH\", \"MCHC\", \"RDW\", \"MPV\", \"NRBC\", BMP/CMP: No results found for: \"SODIUM\", \"K\", \"CL\", \"CO2\", \"ANIONGAP\", \"BUN\", \"CREATININE\", \"GLUCOSE\", \"CALCIUM\", \"AST\", \"ALT\", \"ALKPHOS\", \"PROT\", \"BILITOT\", \"EGFR\"        Akash Conde MD  General Surgery   02/27/24              "

## 2024-02-27 NOTE — CASE MANAGEMENT
Case Management Discharge Planning Note    Patient name Eagle Nava  Location [facility] /E5 -* MRN [id number]  : 1960 Date 2024       Current Admission Date: 2024  Current Admission Diagnosis:Obstructive sleep apnea   Patient Active Problem List    Diagnosis Date Noted    Coronary artery disease 2024    HLD (hyperlipidemia) 2024    Neurocardiogenic syncope 2024    Obstructive sleep apnea 2021      LOS (days): 4  Geometric Mean LOS (GMLOS) (days):   Days to GMLOS:     OBJECTIVE:  Risk of Unplanned Readmission Score: 8.28         Current admission status: Inpatient   Preferred Pharmacy:   [id number] [place] [facility] [address]  [address] [address]  Phone: [phone] Fax: [phone]    [id number] [address]  [address]  [facility] [address]  Phone: [phone] Fax: [phone]    Primary Care Provider: No primary care provider on file.    Primary Insurance: [employer] [facility]  Secondary Insurance:     DISCHARGE DETAILS:    Discharge planning discussed with:: Patient  Freedom of Choice: Yes  Comments - Freedom of Choice: Home no needs  CM contacted family/caregiver?: Yes  Were Treatment Team discharge recommendations reviewed with patient/caregiver?: Yes  Did patient/caregiver verbalize understanding of patient care needs?: Yes  Were patient/caregiver advised of the risks associated with not following Treatment Team discharge recommendations?: Yes    Contacts  Patient Contacts: ROBBINJERRY (Spouse)  [phone] (Mobile)  Relationship to Patient:: Family  Contact Method: In Person  Phone Number: JERRY NAVA (Spouse)  [phone] (Mobile)  Reason/Outcome: Discharge Planning    Requested Home Health Care         Is the patient interested in HHC at discharge?: No    DME Referral Provided  Referral made for DME?: No     Would you like to participate in our [facility]arm[facility] service program?  : No - Declined    Treatment Team Recommendation: Home  Discharge Destination Plan:: Home  Transport at Discharge : Family   Transfer Mode: Ambulate  Accompanied by: Family member      Additional Comments: Patient is medically cleared for discharge home with no needs. Spouse will provide transport home. Patient Verbally states he doesn't needs home services. No barriers to d/c at this time. CM will continue to follow.

## 2024-02-27 NOTE — PLAN OF CARE
Problem: PAIN - ADULT  Goal: Verbalizes/displays adequate comfort level or baseline comfort level  Description: Interventions:  - Encourage patient to monitor pain and request assistance  - Assess pain using appropriate pain scale  - Administer analgesics based on type and severity of pain and evaluate response  - Implement non-pharmacological measures as appropriate and evaluate response  - Consider cultural and social influences on pain and pain management  - Notify physician/advanced practitioner if interventions unsuccessful or patient reports new pain  Outcome: Progressing     Problem: INFECTION - ADULT  Goal: Absence or prevention of progression during hospitalization  Description: INTERVENTIONS:  - Assess and monitor for signs and symptoms of infection  - Monitor lab/diagnostic results  - Monitor all insertion sites, i.e. indwelling lines, tubes, and drains  - Monitor endotracheal if appropriate and nasal secretions for changes in amount and color  - [place] appropriate cooling/warming therapies per order  - Administer medications as ordered  - Instruct and encourage patient and family to use good hand hygiene technique  - Identify and instruct in appropriate isolation precautions for identified infection/condition  Outcome: Progressing     Problem: SAFETY ADULT  Goal: Patient will remain free of falls  Description: INTERVENTIONS:  - Educate patient/family on patient safety including physical limitations  - Instruct patient to call for assistance with activity   - Consult OT/PT to assist with strengthening/mobility   - Keep Call bell within reach  - Keep bed low and locked with side rails adjusted as appropriate  - Keep care items and personal belongings within reach  - Initiate and maintain comfort rounds  - Make Fall Risk Sign visible to staff  - Apply yellow socks and bracelet for high fall risk patients  - Consider moving patient to room near nurses station  Outcome: Progressing  Goal: Maintain or  return to baseline ADL function  Description: INTERVENTIONS:  -  Assess patient's ability to carry out ADLs; assess patient's baseline for ADL function and identify physical deficits which impact ability to perform ADLs (bathing, care of mouth/teeth, toileting, grooming, dressing, etc.)  - Assess/evaluate cause of self-care deficits   - Assess range of motion  - Assess patient's mobility; develop plan if impaired  - Assess patient's need for assistive devices and provide as appropriate  - Encourage maximum independence but intervene and supervise when necessary  - Involve family in performance of ADLs  - Assess for home care needs following discharge   - Consider OT consult to assist with ADL evaluation and planning for discharge  - Provide patient education as appropriate  Outcome: Progressing  Goal: Maintains/Returns to pre admission functional level  Description: INTERVENTIONS:  - Perform AM-PAC 6 Click Basic Mobility/ Daily Activity assessment daily.  - Set and communicate daily mobility goal to care team and patient/family/caregiver.   - Collaborate with rehabilitation services on mobility goals if consulted  - Perform Range of Motion 3 times a day.  - Reposition patient every 3 hours.  - Dangle patient 3 times a day  - Stand patient 3 times a day  - Ambulate patient 3 times a day  - Out of bed to chair 3 times a day   - Out of bed for meals 3 times a day  - Out of bed for toileting  - Record patient progress and toleration of activity level   Outcome: Progressing     Problem: DISCHARGE PLANNING  Goal: Discharge to home or other facility with appropriate resources  Description: INTERVENTIONS:  - Identify barriers to discharge w/patient and caregiver  - Arrange for needed discharge resources and transportation as appropriate  - Identify discharge learning needs (meds, wound care, etc.)  - Arrange for interpretive services to assist at discharge as needed  - Refer to Case Management Department for coordinating  discharge planning if the patient needs post-hospital services based on physician/advanced practitioner order or complex needs related to functional status, cognitive ability, or social support system  Outcome: Progressing     Problem: GASTROINTESTINAL - ADULT  Goal: Minimal or absence of nausea and/or vomiting  Description: INTERVENTIONS:  - Administer IV fluids if ordered to ensure adequate hydration  - Maintain NPO status until nausea and vomiting are resolved  - Nasogastric tube if ordered  - Administer ordered antiemetic medications as needed  - Provide nonpharmacologic comfort measures as appropriate  - Advance diet as tolerated, if ordered  - Consider nutrition services referral to assist patient with adequate nutrition and appropriate food choices  Outcome: Progressing  Goal: Maintains or returns to baseline bowel function  Description: INTERVENTIONS:  - Assess bowel function  - Encourage oral fluids to ensure adequate hydration  - Administer IV fluids if ordered to ensure adequate hydration  - Administer ordered medications as needed  - Encourage mobilization and activity  - Consider nutritional services referral to assist patient with adequate nutrition and appropriate food choices  Outcome: Progressing  Goal: Maintains adequate nutritional intake  Description: INTERVENTIONS:  - Monitor percentage of each meal consumed  - Identify factors contributing to decreased intake, treat as appropriate  - Assist with meals as needed  - Monitor I&O, weight, and lab values if indicated  - Obtain nutrition services referral as needed  Outcome: Progressing     Problem: GASTROINTESTINAL - ADULT  Goal: Minimal or absence of nausea and/or vomiting  Description: INTERVENTIONS:  - Administer IV fluids if ordered to ensure adequate hydration  - Maintain NPO status until nausea and vomiting are resolved  - Nasogastric tube if ordered  - Administer ordered antiemetic medications as needed  - Provide nonpharmacologic comfort  measures as appropriate  - Advance diet as tolerated, if ordered  - Consider nutrition services referral to assist patient with adequate nutrition and appropriate food choices  Outcome: Progressing  Goal: Maintains or returns to baseline bowel function  Description: INTERVENTIONS:  - Assess bowel function  - Encourage oral fluids to ensure adequate hydration  - Administer IV fluids if ordered to ensure adequate hydration  - Administer ordered medications as needed  - Encourage mobilization and activity  - Consider nutritional services referral to assist patient with adequate nutrition and appropriate food choices  Outcome: Progressing  Goal: Maintains adequate nutritional intake  Description: INTERVENTIONS:  - Monitor percentage of each meal consumed  - Identify factors contributing to decreased intake, treat as appropriate  - Assist with meals as needed  - Monitor I&O, weight, and lab values if indicated  - Obtain nutrition services referral as needed  Outcome: Progressing     Problem: Nutrition/Hydration-ADULT  Goal: Nutrient/Hydration intake appropriate for improving, restoring or maintaining nutritional needs  Description: Monitor and assess patient's nutrition/hydration status for malnutrition. Collaborate with interdisciplinary team and initiate plan and interventions as ordered.  Monitor patient's weight and dietary intake as ordered or per policy. Utilize nutrition screening tool and intervene as necessary. Determine patient's food preferences and provide high-protein, high-caloric foods as appropriate.     INTERVENTIONS:  - Monitor oral intake, urinary output, labs, and treatment plans  - Assess nutrition and hydration status and recommend course of action  - Evaluate amount of meals eaten  - Assist patient with eating if necessary   - Allow adequate time for meals  - Recommend/ encourage appropriate diets, oral nutritional supplements, and vitamin/mineral supplements  - Order, calculate, and assess calorie  counts as needed  - Recommend, monitor, and adjust tube feedings and TPN/PPN based on assessed needs  - Assess need for intravenous fluids  - Provide specific nutrition/hydration education as appropriate  - Include patient/family/caregiver in decisions related to nutrition  Outcome: Progressing
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
## 2024-08-06 ENCOUNTER — EVALUATION (OUTPATIENT)
Dept: PHYSICAL THERAPY | Facility: CLINIC | Age: 64
End: 2024-08-06
Payer: COMMERCIAL

## 2024-08-06 DIAGNOSIS — M47.812 CERVICAL SPONDYLOSIS: ICD-10-CM

## 2024-08-06 DIAGNOSIS — M54.12 CERVICAL RADICULOPATHY: Primary | ICD-10-CM

## 2024-08-06 PROCEDURE — 97162 PT EVAL MOD COMPLEX 30 MIN: CPT

## 2024-08-06 PROCEDURE — 97110 THERAPEUTIC EXERCISES: CPT
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
## 2024-08-13 ENCOUNTER — OFFICE VISIT (OUTPATIENT)
Dept: PHYSICAL THERAPY | Facility: CLINIC | Age: 64
End: 2024-08-13
Payer: COMMERCIAL

## 2024-08-13 DIAGNOSIS — M54.12 CERVICAL RADICULOPATHY: Primary | ICD-10-CM

## 2024-08-13 DIAGNOSIS — M47.812 CERVICAL SPONDYLOSIS: ICD-10-CM

## 2024-08-13 PROCEDURE — 97140 MANUAL THERAPY 1/> REGIONS: CPT

## 2024-08-13 PROCEDURE — 97110 THERAPEUTIC EXERCISES: CPT

## 2024-08-13 PROCEDURE — 97112 NEUROMUSCULAR REEDUCATION: CPT

## 2024-08-13 NOTE — PROGRESS NOTES
"Daily Note     Today's date: 2024  Patient name: Eagle Nava  : 1960  MRN: [id number]  Referring provider: Terence Patton MD  Dx:   Encounter Diagnosis     ICD-10-CM    1. Cervical radiculopathy  M54.12       2. Cervical spondylosis  M47.812           Start Time: 08  Stop Time: 922  Total time in clinic (min): 44 minutes    Subjective: Patient reports his neck is feeling \"not bad\" today but he woke up and his B elbows were \"sore\". Reports he used Voltaren gel which helped with this. Reports HEP is going well and notes most relief with scapular retractions.      Objective: See treatment diary below      Assessment: Tolerated treatment well. Patient demonstrated fatigue post treatment, exhibited good technique with therapeutic exercises, and would benefit from continued PT. Good tolerance noted to initiation of exercise program with a focus on scapular stability and motor control. Decreased joint mobility noted in his mid thoracic spine with improvements noted following PA mobs. Relief noted with postural stretching.      Plan: Continue per plan of care.  Progress treatment as tolerated.       Precautions: R carpal tunnel release and ulnar nerve transposition on 11/15/23, hx B carpal and cubital tunnel release 10 years ago, hx neurocardiogenic syncope       Manuals            Cervical STM and SOR  MK           Thoracic PAs and STM  MK gr.2-3                                     Neuro Re-Ed             Chin tucks Supine HEP Supine 5\"x10 , seated 5\"x10           Scapular retractions HEP            Scap 4  Rows Blue 5\"x20, Ext grn 5\"x20           Serratus wall slides  RTB 5\"x15            Wall angels             Retraction with ext  nv           No monies             Median and ulnar nerve glides             Ther Ex             UBE  2'/2'           Doorway pec stretch  nv            SNAGs             FR protocol             Open books HEP X10 B           UT stretch             LS stretch  " "HEP            Supine thoracic ext with pec stretch HEP Seated 5\"x15           Full can             Ther Activity                                       Gait Training                                       Modalities                                            "

## 2024-08-27 ENCOUNTER — OFFICE VISIT (OUTPATIENT)
Dept: PHYSICAL THERAPY | Facility: CLINIC | Age: 64
End: 2024-08-27
Payer: COMMERCIAL

## 2024-08-27 DIAGNOSIS — M54.12 CERVICAL RADICULOPATHY: Primary | ICD-10-CM

## 2024-08-27 DIAGNOSIS — M47.812 CERVICAL SPONDYLOSIS: ICD-10-CM

## 2024-08-27 PROCEDURE — 97110 THERAPEUTIC EXERCISES: CPT

## 2024-08-27 PROCEDURE — 97140 MANUAL THERAPY 1/> REGIONS: CPT

## 2024-08-27 PROCEDURE — 97112 NEUROMUSCULAR REEDUCATION: CPT

## 2024-08-27 NOTE — PROGRESS NOTES
"Daily Note     Today's date: 2024  Patient name: Eagle Nava  : 1960  MRN: [id number]  Referring provider: Terence Patton MD  Dx:   Encounter Diagnosis     ICD-10-CM    1. Cervical radiculopathy  M54.12       2. Cervical spondylosis  M47.812             Start Time: 08  Stop Time: 925  Total time in clinic (min): 43 minutes    Subjective: Patient reports his neck is feeling \"good\" today. Notes some \"achiness\" in his forearms.      Objective: See treatment diary below      Assessment: Tolerated treatment well. Patient demonstrated fatigue post treatment, exhibited good technique with therapeutic exercises, and would benefit from continued PT. Good tolerance to exercise progressions made this session with a focus on improving scapular stability and thoracic mobility. Cueing required during seated chin tucks for form. No change in forearm symptoms with cervical retraction with extension. Added median nerve glides to address forearm symptoms with good tolerance and stretching noted in his forearms.       Plan: Continue per plan of care.  Progress treatment as tolerated.       Precautions: R carpal tunnel release and ulnar nerve transposition on 11/15/23, hx B carpal and cubital tunnel release 10 years ago, hx neurocardiogenic syncope       Manuals           Cervical STM and SOR  MK MK and lateral glides          Thoracic PAs and STM  MK gr.2-3 MK gr.2-3                                    Neuro Re-Ed             Chin tucks Supine HEP Supine 5\"x10 , seated 5\"x10 Supine 5\"x10 , seated 5\"x10           Scapular retractions HEP  Prone 5\"x15          Scap 4  Rows Blue 5\"x20, Ext grn 5\"x20 Rows Blk 5\"x20, Ext blue 5\"x20          Serratus wall slides  RTB 5\"x15            Wall angels             Retraction with ext  nv 1x10          No monies   Blue 2x10          Median and ulnar nerve glides   Median x15 ea          Ther Ex             UBE  2'/2' 2'/2'          Doorway pec stretch  nv  3x20\"   " "       SNA             FR protocol             Open books HEP X10 B X10 B          UT stretch             LS stretch  HEP            Supine thoracic ext with pec stretch HEP Seated 5\"x15 Seated 5\"x15          Full can             Ther Activity                                       Gait Training                                       Modalities                                            "

## 2024-09-04 ENCOUNTER — OFFICE VISIT (OUTPATIENT)
Dept: PHYSICAL THERAPY | Facility: CLINIC | Age: 64
End: 2024-09-04
Payer: COMMERCIAL

## 2024-09-04 DIAGNOSIS — M54.12 CERVICAL RADICULOPATHY: Primary | ICD-10-CM

## 2024-09-04 DIAGNOSIS — M47.812 CERVICAL SPONDYLOSIS: ICD-10-CM

## 2024-09-04 PROCEDURE — 97110 THERAPEUTIC EXERCISES: CPT

## 2024-09-04 PROCEDURE — 97140 MANUAL THERAPY 1/> REGIONS: CPT

## 2024-09-04 PROCEDURE — 97112 NEUROMUSCULAR REEDUCATION: CPT

## 2024-09-04 NOTE — PROGRESS NOTES
"Daily Note     Today's date: 2024  Patient name: Eagle Nava  : 1960  MRN: [id number]  Referring provider: Terence Patton MD  Dx:   Encounter Diagnosis     ICD-10-CM    1. Cervical radiculopathy  M54.12       2. Cervical spondylosis  M47.812               Start Time: 1000  Stop Time: 1045  Total time in clinic (min): 45 minutes    Subjective: Patient reports his neck is feeling better overall and notes most pain and \"achiness\" in his B forearms, especially at night. Notes felt better after taking muscle relaxer. Also some improvement following median nerve glides.      Objective: See treatment diary below      Assessment: Tolerated treatment well. Patient demonstrated fatigue post treatment, exhibited good technique with therapeutic exercises, and would benefit from continued PT. Decreased neural tension noted following manual nerve glides and reviewed for HEP how to complete actively. Added foam roll protocol to improve thoracic mobility with incorporating UE movement to further reduce muscle tension.       Plan: Continue per plan of care.  Progress treatment as tolerated.       Precautions: R carpal tunnel release and ulnar nerve transposition on 11/15/23, hx B carpal and cubital tunnel release 10 years ago, hx neurocardiogenic syncope       Manuals  9         Cervical STM and SOR  MK MK and lateral glides          Thoracic PAs and STM  MK gr.2-3 MK gr.2-3          Manual median, ulnar, and radial nerve glides    MK                      Neuro Re-Ed             Chin tucks Supine HEP Supine 5\"x10 , seated 5\"x10 Supine 5\"x10 , seated 5\"x10           Scapular retractions HEP  Prone 5\"x15          Scap 4  Rows Blue 5\"x20, Ext grn 5\"x20 Rows Blk 5\"x20, Ext blue 5\"x20          Serratus wall slides  RTB 5\"x15            Wall angels             Retraction with ext  nv 1x10 1x10         No monies   Blue 2x10 Blue 2x10         Median, ulnar, radial nerve glides   Median x15 ea X15 ea B       " "  Ther Ex             UBE  2'/2' 2'/2' 2'/2'         Doorway pec stretch  nv  3x20\"          Bienvenido             FR protocol    2 min pec stretch, x15 ea; chin tucks, scissors, bear hugs, angels         Open books HEP X10 B X10 B          Wrist flx/ext stretching    Nv?         LS stretch  HEP            Supine thoracic ext with pec stretch HEP Seated 5\"x15 Seated 5\"x15          Full can             Ther Activity                                       Gait Training                                       Modalities                                            "

## 2024-09-11 ENCOUNTER — OFFICE VISIT (OUTPATIENT)
Dept: PHYSICAL THERAPY | Facility: CLINIC | Age: 64
End: 2024-09-11
Payer: COMMERCIAL

## 2024-09-11 DIAGNOSIS — M47.812 CERVICAL SPONDYLOSIS: ICD-10-CM

## 2024-09-11 DIAGNOSIS — M54.12 CERVICAL RADICULOPATHY: Primary | ICD-10-CM

## 2024-09-11 PROCEDURE — 97112 NEUROMUSCULAR REEDUCATION: CPT

## 2024-09-11 PROCEDURE — 97140 MANUAL THERAPY 1/> REGIONS: CPT

## 2024-09-11 PROCEDURE — 97110 THERAPEUTIC EXERCISES: CPT

## 2024-09-11 NOTE — PROGRESS NOTES
"Daily Note     Today's date: 2024  Patient name: Eagle Nava  : 1960  MRN: [id number]  Referring provider: Terence Patton MD  Dx:   Encounter Diagnosis     ICD-10-CM    1. Cervical radiculopathy  M54.12       2. Cervical spondylosis  M47.812           Start Time: 0830  Stop Time: 0915  Total time in clinic (min): 45 minutes    Subjective: Patient reports his forearms have been feeling better and he has been sleeping better.      Objective: See treatment diary below      Assessment: Tolerated treatment well. Patient demonstrated fatigue post treatment, exhibited good technique with therapeutic exercises, and would benefit from continued PT. Improved neural mobility noted compared to last session with most tension noted in his radial nerve. Good carryover noted with nerve glides. Consider progressing to tensioners next visit to further address neural tension.       Plan: Continue per plan of care.  Progress treatment as tolerated.  Anticipate discharge to HEP next visit due to good improvements and independence with his HEP.     Precautions: R carpal tunnel release and ulnar nerve transposition on 11/15/23, hx B carpal and cubital tunnel release 10 years ago, hx neurocardiogenic syncope       Manuals         Cervical STM and SOR  MK MK and lateral glides          Thoracic PAs and STM  MK gr.2-3 MK gr.2-3          Manual median, ulnar, and radial nerve glides    MK MK        Cervical retraction to ext             Neuro Re-Ed             Chin tucks Supine HEP Supine 5\"x10 , seated 5\"x10 Supine 5\"x10 , seated 5\"x10           Scapular retractions HEP  Prone 5\"x15          Scap 4  Rows Blue 5\"x20, Ext grn 5\"x20 Rows Blk 5\"x20, Ext blue 5\"x20          Serratus wall slides  RTB 5\"x15            Wall angels             Retraction with ext  nv 1x10 1x10 1x10        No monies   Blue 2x10 Blue 2x10 Blue 3x10        Median, ulnar, radial nerve glides   Median x15 ea X15 ea B X15 ea B      " "  Ther Ex             UBE  2'/2' 2'/2' 2'/2' 2'/2' lvl 1.6        Doorway pec stretch  nv  3x20\"          SNAGs             FR protocol    2 min pec stretch, x15 ea; chin tucks, scissors, bear hugs, angels 2 min pec stretch, x15 ea; chin tucks, scissors, bear hugs, angels        Open books HEP X10 B X10 B          Wrist flx/ext stretching    Nv?         LS stretch  HEP            Supine thoracic ext with pec stretch HEP Seated 5\"x15 Seated 5\"x15          Full can             Ther Activity                                       Gait Training                                       Modalities                                            "

## 2024-09-17 ENCOUNTER — OFFICE VISIT (OUTPATIENT)
Dept: PHYSICAL THERAPY | Facility: CLINIC | Age: 64
End: 2024-09-17
Payer: COMMERCIAL

## 2024-09-17 DIAGNOSIS — M47.812 CERVICAL SPONDYLOSIS: ICD-10-CM

## 2024-09-17 DIAGNOSIS — M54.12 CERVICAL RADICULOPATHY: Primary | ICD-10-CM

## 2024-09-17 PROCEDURE — 97110 THERAPEUTIC EXERCISES: CPT

## 2024-09-17 PROCEDURE — 97112 NEUROMUSCULAR REEDUCATION: CPT

## 2024-09-17 NOTE — LETTER
2024    Terence Patton MD  [address] [facility] [address] [place] [address]    Patient: Eagle Nava   YOB: 1960   Date of Visit: 2024     Encounter Diagnosis     ICD-10-CM    1. Cervical radiculopathy  M54.12       2. Cervical spondylosis  M47.812           Dear Dr. Patton:    Thank you for your recent referral of Eagle Nava. Please review the attached evaluation summary from Eagle's recent visit.     Please verify that you agree with the plan of care by signing the attached order.     If you have any questions or concerns, please do not hesitate to call.     I sincerely appreciate the opportunity to share in the care of one of your patients and hope to have another opportunity to work with you in the near future.       Sincerely,    Emily Ingram, PT      Referring Provider:      I certify that I have read the below Plan of Care and certify the need for these services furnished under this plan of treatment while under my care.                    Terence Patton MD  [address] [place] [address]  Via Fax: [phone]          PT Discharge     Today's date: 2024  Patient name: Eagle Nava  : 1960  MRN: [id number]  Referring provider: Terence Patton MD  Dx:   Encounter Diagnosis     ICD-10-CM    1. Cervical radiculopathy  M54.12       2. Cervical spondylosis  M47.812           Start Time: 0845  Stop Time: 0930  Total time in clinic (min): 45 minutes    Subjective: Patient reports his neck and forearms have been feeling better and he has been sleeping well without pain. Denies pain currently and notes he is doing well overall. Notes he is able to complete all ADLs without increased pain and he is independent and compliant with his HEP. Agreeable to compete PT today and continue with his HEP.      Objective: See treatment diary below    Goals  STG - to be met by week 4  1. Patient will be independent with HEP throughout therapy to  prepare for eventual transition to maintenance program. - goal met  2. Patient will improve subjective pain to <=4/10 at worst to improve QOL. - goal met  3. Patient will improve cervical rotation AROM to minimal restriction to drive with less difficulty. - goal met     LTG - to be met by discharge   1. Patient will improve cervical AROM to grossly WFL to perform ADLs with less difficulty. - goal met  2. Patient will improve BUE strength to grossly >=5/5 to improve functional mobility. - goal met  3. Patient will improve FOTO score to age matched prediction to demonstrate functional improvements. - goal met  4. Patient will be able to lift 20lbs without difficulty to return to PLOF. - goal met  5. Patient will be able to do yard work without difficulty to return to PLOF. - goal met    Postural Observations  Seated posture: fair+  Standing posture: fair+  Correction of posture: has no consistent effect     Additional Postural Observation Details  Forward head, rounded shoulders, thoracic kyphosis      Palpation      Additional Palpation Details  No TTP noted     Neurological Testing      Additional Neurological Details  BUE sensation intact to light touch     Active Range of Motion   Cervical/Thoracic Spine         Cervical     Flexion:  WFL  Extension:  WFL  Left lateral flexion: WFL  Right lateral flexion: WFL  Left rotation: WFL  Right rotation: WFL     Thoracic     Extension: minimal restriction     Joint Play      Hypomobile: T1, T2, T3, T4, T5 and T6      Comments: R cervical spine mild hypomobile with lateral glides     Strength/Myotome Testing      Left Shoulder      Planes of Motion   Flexion: 5   Abduction: 5      Isolated Muscles   Lower trapezius: 4   Middle trapezius: 4      Right Shoulder      Planes of Motion   Flexion: 5   Abduction: 5      Isolated Muscles   Lower trapezius: 4   Middle trapezius: 4    Increased neural tension noted in his B radial nerves > median nerves > ulnar nerves with tension  "noted in his forearms      Assessment: Patient presents to PT for discharge. Subjectively, patient reports he has made improvements in his pain levels, flexibility, and ability to manage his symptoms since beginning PT. Objectively, patient has made improvements in cervical ROM, scapular stability, neural tension, and BUE strength. Patient has met all of his therapy goals and is independent with his HEP. Patient was discharged from physical therapy.        Plan: Patient was discharged from physical therapy.       Precautions: R carpal tunnel release and ulnar nerve transposition on 11/15/23, hx B carpal and cubital tunnel release 10 years ago, hx neurocardiogenic syncope       Manuals 8/6 8/13 8/27 9/4 9/11 9/17       Cervical STM and SOR  MK MK and lateral glides          Thoracic PAs and STM  MK gr.2-3 MK gr.2-3          Manual median, ulnar, and radial nerve glides    MK MK        Cervical retraction to ext             Neuro Re-Ed             Chin tucks Supine HEP Supine 5\"x10 , seated 5\"x10 Supine 5\"x10 , seated 5\"x10           Scapular retractions HEP  Prone 5\"x15          Scap 4  Rows Blue 5\"x20, Ext grn 5\"x20 Rows Blk 5\"x20, Ext blue 5\"x20          Serratus wall slides  RTB 5\"x15            Wall angels             Retraction with ext  nv 1x10 1x10 1x10 1x10       No monies   Blue 2x10 Blue 2x10 Blue 3x10 Blue 3x10       Median, ulnar, radial nerve glides   Median x15 ea X15 ea B X15 ea B X15 ea B       Ther Ex             UBE  2'/2' 2'/2' 2'/2' 2'/2' lvl 1.6 2'/2' lvl 1.6       Doorway pec stretch  nv  3x20\"          SNAGs             FR protocol    2 min pec stretch, x15 ea; chin tucks, scissors, bear hugs, angels 2 min pec stretch, x15 ea; chin tucks, scissors, bear hugs, angels 2 min pec stretch, x15 ea; chin tucks, scissors, bear hugs, angels       Open books HEP X10 B X10 B   X10 B       Wrist flx/ext stretching    Nv?         LS stretch  HEP            Supine thoracic ext with pec stretch HEP Seated " "5\"x15 Seated 5\"x15          Full can             Ther Activity                                       Gait Training                                       Modalities                                                            "

## 2024-09-17 NOTE — PROGRESS NOTES
PT Discharge     Today's date: 2024  Patient name: Eagle Nvaa  : 1960  MRN: [id number]  Referring provider: Terence Patton MD  Dx:   Encounter Diagnosis     ICD-10-CM    1. Cervical radiculopathy  M54.12       2. Cervical spondylosis  M47.812           Start Time: 0845  Stop Time: 930  Total time in clinic (min): 45 minutes    Subjective: Patient reports his neck and forearms have been feeling better and he has been sleeping well without pain. Denies pain currently and notes he is doing well overall. Notes he is able to complete all ADLs without increased pain and he is independent and compliant with his HEP. Agreeable to compete PT today and continue with his HEP.      Objective: See treatment diary below    Goals  STG - to be met by week 4  1. Patient will be independent with HEP throughout therapy to prepare for eventual transition to maintenance program. - goal met  2. Patient will improve subjective pain to <=4/10 at worst to improve QOL. - goal met  3. Patient will improve cervical rotation AROM to minimal restriction to drive with less difficulty. - goal met     LTG - to be met by discharge   1. Patient will improve cervical AROM to grossly WFL to perform ADLs with less difficulty. - goal met  2. Patient will improve BUE strength to grossly >=5/5 to improve functional mobility. - goal met  3. Patient will improve FOTO score to age matched prediction to demonstrate functional improvements. - goal met  4. Patient will be able to lift 20lbs without difficulty to return to PLOF. - goal met  5. Patient will be able to do yard work without difficulty to return to PLOF. - goal met    Postural Observations  Seated posture: fair+  Standing posture: fair+  Correction of posture: has no consistent effect     Additional Postural Observation Details  Forward head, rounded shoulders, thoracic kyphosis      Palpation      Additional Palpation Details  No TTP noted     Neurological Testing     "  Additional Neurological Details  BUE sensation intact to light touch     Active Range of Motion   Cervical/Thoracic Spine         Cervical     Flexion:  WFL  Extension:  WFL  Left lateral flexion: WFL  Right lateral flexion: WFL  Left rotation: WFL  Right rotation: WFL     Thoracic     Extension: minimal restriction     Joint Play      Hypomobile: T1, T2, T3, T4, T5 and T6      Comments: R cervical spine mild hypomobile with lateral glides     Strength/Myotome Testing      Left Shoulder      Planes of Motion   Flexion: 5   Abduction: 5      Isolated Muscles   Lower trapezius: 4   Middle trapezius: 4      Right Shoulder      Planes of Motion   Flexion: 5   Abduction: 5      Isolated Muscles   Lower trapezius: 4   Middle trapezius: 4    Increased neural tension noted in his B radial nerves > median nerves > ulnar nerves with tension noted in his forearms      Assessment: Patient presents to PT for discharge. Subjectively, patient reports he has made improvements in his pain levels, flexibility, and ability to manage his symptoms since beginning PT. Objectively, patient has made improvements in cervical ROM, scapular stability, neural tension, and BUE strength. Patient has met all of his therapy goals and is independent with his HEP. Patient was discharged from physical therapy.        Plan: Patient was discharged from physical therapy.       Precautions: R carpal tunnel release and ulnar nerve transposition on 11/15/23, hx B carpal and cubital tunnel release 10 years ago, hx neurocardiogenic syncope       Manuals 8/6 8/13 8/27 9/4 9/11 9/17       Cervical STM and SOR  MK MK and lateral glides          Thoracic PAs and STM  MK gr.2-3 MK gr.2-3          Manual median, ulnar, and radial nerve glides    MK MK        Cervical retraction to ext             Neuro Re-Ed             Chin tucks Supine HEP Supine 5\"x10 , seated 5\"x10 Supine 5\"x10 , seated 5\"x10           Scapular retractions HEP  Prone 5\"x15          Scap 4  " "Rows Blue 5\"x20, Ext grn 5\"x20 Rows Blk 5\"x20, Ext blue 5\"x20          Serratus wall slides  RTB 5\"x15            Wall angels             Retraction with ext  nv 1x10 1x10 1x10 1x10       No monies   Blue 2x10 Blue 2x10 Blue 3x10 Blue 3x10       Median, ulnar, radial nerve glides   Median x15 ea X15 ea B X15 ea B X15 ea B       Ther Ex             UBE  2'/2' 2'/2' 2'/2' 2'/2' lvl 1.6 2'/2' lvl 1.6       Doorway pec stretch  nv  3x20\"          SNAGs             FR protocol    2 min pec stretch, x15 ea; chin tucks, scissors, bear hugs, angels 2 min pec stretch, x15 ea; chin tucks, scissors, bear hugs, angels 2 min pec stretch, x15 ea; chin tucks, scissors, bear hugs, angels       Open books HEP X10 B X10 B   X10 B       Wrist flx/ext stretching    Nv?         LS stretch  HEP            Supine thoracic ext with pec stretch HEP Seated 5\"x15 Seated 5\"x15          Full can             Ther Activity                                       Gait Training                                       Modalities                                            "

## 2024-10-02 ENCOUNTER — TELEPHONE (OUTPATIENT)
Age: 64
End: 2024-10-02

## 2024-12-05 ENCOUNTER — OFFICE VISIT (OUTPATIENT)
Age: 64
End: 2024-12-05
Payer: MEDICARE

## 2024-12-05 VITALS
HEIGHT: 71 IN | SYSTOLIC BLOOD PRESSURE: 140 MMHG | DIASTOLIC BLOOD PRESSURE: 90 MMHG | WEIGHT: 236 LBS | TEMPERATURE: 98.1 F | BODY MASS INDEX: 33.04 KG/M2 | OXYGEN SATURATION: 98 % | HEART RATE: 80 BPM

## 2024-12-05 DIAGNOSIS — E78.5 HYPERLIPIDEMIA, UNSPECIFIED HYPERLIPIDEMIA TYPE: ICD-10-CM

## 2024-12-05 DIAGNOSIS — I25.811 CORONARY ARTERY DISEASE INVOLVING NATIVE ARTERY OF TRANSPLANTED HEART WITHOUT ANGINA PECTORIS: ICD-10-CM

## 2024-12-05 DIAGNOSIS — M47.812 CERVICAL SPONDYLOSIS WITHOUT MYELOPATHY: ICD-10-CM

## 2024-12-05 DIAGNOSIS — I10 ESSENTIAL (PRIMARY) HYPERTENSION: Primary | ICD-10-CM

## 2024-12-05 DIAGNOSIS — G47.33 OBSTRUCTIVE SLEEP APNEA: ICD-10-CM

## 2024-12-05 DIAGNOSIS — H90.3 SENSORINEURAL HEARING LOSS, BILATERAL: ICD-10-CM

## 2024-12-05 DIAGNOSIS — R55 NEUROCARDIOGENIC SYNCOPE: ICD-10-CM

## 2024-12-05 DIAGNOSIS — G56.03 BILATERAL CARPAL TUNNEL SYNDROME: ICD-10-CM

## 2024-12-05 DIAGNOSIS — E78.2 MIXED HYPERLIPIDEMIA: ICD-10-CM

## 2024-12-05 PROBLEM — E55.9 VITAMIN D DEFICIENCY: Status: ACTIVE | Noted: 2024-12-05

## 2024-12-05 PROBLEM — M50.10 CERVICAL DISC DISORDER WITH RADICULOPATHY: Status: ACTIVE | Noted: 2024-12-05

## 2024-12-05 PROBLEM — E53.8 COBALAMIN DEFICIENCY: Status: ACTIVE | Noted: 2024-12-05

## 2024-12-05 PROBLEM — G89.29 OTHER CHRONIC PAIN: Status: ACTIVE | Noted: 2024-12-05

## 2024-12-05 PROBLEM — G56.00 CARPAL TUNNEL SYNDROME: Status: ACTIVE | Noted: 2024-12-05

## 2024-12-05 PROCEDURE — 99204 OFFICE O/P NEW MOD 45 MIN: CPT | Performed by: FAMILY MEDICINE

## 2024-12-05 RX ORDER — GABAPENTIN 300 MG/1
300 CAPSULE ORAL 3 TIMES DAILY
COMMUNITY

## 2024-12-05 RX ORDER — ROSUVASTATIN CALCIUM 40 MG/1
40 TABLET, COATED ORAL DAILY
COMMUNITY

## 2024-12-05 RX ORDER — LIDOCAINE 50 MG/G
PATCH TOPICAL
COMMUNITY
Start: 2024-10-17

## 2024-12-05 RX ORDER — GABAPENTIN 100 MG/1
100 CAPSULE ORAL DAILY
COMMUNITY

## 2024-12-05 RX ORDER — CYCLOBENZAPRINE HCL 10 MG
1 TABLET ORAL
COMMUNITY
Start: 2024-10-18

## 2024-12-05 NOTE — PROGRESS NOTES
Name: Eagle Nava      : 1960      MRN: [id number]  Encounter Provider: Ozzie Woods DO  Encounter Date: 2024   Encounter department: [facility]  :  Assessment & Plan  Essential (primary) hypertension  Stable continue with current low-salt diet we will need to get records to review as well as most recent laboratory studies.       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  Follow-up with cardiology appropriately       Obstructive sleep apnea  Follow-up with sleep specialist if needed       Bilateral carpal tunnel syndrome  To consider seeing Dr. Reuben Woodall       Sensorineural hearing loss, bilateral  Patient given guidance in this regard follow-up with ENT if needed       Cervical spondylosis without myelopathy  Continue current treatment.       Neurocardiogenic syncope  Stable follow-up with cardiology.       Mixed hyperlipidemia  Stable continue with Crestor.       Hyperlipidemia, unspecified hyperlipidemia type               Depression Screening and Follow-up Plan: Patient was screened for depression during today's encounter. They screened negative with a PHQ-2 score of 0.      History of Present Illness     Patient is here as a a new patient to establish care.  [facility]  Patient in normal state of health overall.  Patient with ongoing discomfort in his right arm with history of surgery to ulnar nerve and carpal tunnel bilaterally.  This was done by  [facility].  Patient otherwise in normal state of health.      Review of Systems   Constitutional: Negative.    HENT:  Positive for tinnitus.    Eyes: Negative.    Respiratory: Negative.     Cardiovascular: Negative.    Gastrointestinal: Negative.    Endocrine: Negative.    Genitourinary: Negative.    Musculoskeletal:  Positive for arthralgias.   Skin: Negative.    Allergic/Immunologic:  "Negative.    Neurological:  Positive for numbness.   Hematological: Negative.    Psychiatric/Behavioral: Negative.            Objective   /90 (BP Location: Left arm, Patient Position: Sitting, Cuff Size: Large)   Pulse 80   Temp 98.1 °F (36.7 °C) (Temporal)   Ht 5' 11\" (1.803 m)   Wt 107 kg (236 lb)   SpO2 98%   BMI 32.92 kg/m²      Physical Exam  Vitals and nursing note reviewed.   Constitutional:       General: He is not in acute distress.     Appearance: Normal appearance. He is well-developed. He is not ill-appearing, toxic-appearing or diaphoretic.   HENT:      Head: Normocephalic and atraumatic.      Right Ear: Tympanic membrane, ear canal and external ear normal.      Left Ear: Tympanic membrane, ear canal and external ear normal.      Nose: Nose normal. No congestion or rhinorrhea.      Mouth/Throat:      Pharynx: No oropharyngeal exudate.   Eyes:      General:         Right eye: No discharge.         Left eye: No discharge.   Neck:      Thyroid: No thyromegaly.      Vascular: No carotid bruit.      Trachea: No tracheal deviation.   Cardiovascular:      Rate and Rhythm: Normal rate and regular rhythm.      Heart sounds: Normal heart sounds. No murmur heard.     No gallop.   Pulmonary:      Effort: Pulmonary effort is normal. No respiratory distress.      Breath sounds: Normal breath sounds. No stridor. No wheezing or rales.   Chest:      Chest wall: No tenderness.   Musculoskeletal:         General: No tenderness or deformity. Normal range of motion.      Cervical back: Normal range of motion and neck supple.   Lymphadenopathy:      Cervical: No cervical adenopathy.   Skin:     General: Skin is warm and dry.      Coloration: Skin is not pale.      Findings: No erythema or rash.   Neurological:      Mental Status: He is alert and oriented to person, place, and time.      Motor: No abnormal muscle tone.      Gait: Gait normal.   Psychiatric:         Behavior: Behavior normal.         Thought " Content: Thought content normal.         Judgment: Judgment normal.

## 2024-12-05 NOTE — ASSESSMENT & PLAN NOTE
Stable continue with current low-salt diet we will need to get records to review as well as most recent laboratory studies.

## 2025-05-31 ENCOUNTER — RA CDI HCC (OUTPATIENT)
Dept: OTHER | Facility: HOSPITAL | Age: 65
End: 2025-05-31

## 2025-06-09 ENCOUNTER — OFFICE VISIT (OUTPATIENT)
Age: 65
End: 2025-06-09
Payer: COMMERCIAL

## 2025-06-09 VITALS
WEIGHT: 228.2 LBS | SYSTOLIC BLOOD PRESSURE: 130 MMHG | DIASTOLIC BLOOD PRESSURE: 74 MMHG | OXYGEN SATURATION: 95 % | TEMPERATURE: 98.2 F | HEIGHT: 71 IN | BODY MASS INDEX: 31.95 KG/M2 | HEART RATE: 65 BPM

## 2025-06-09 DIAGNOSIS — Z00.00 MEDICARE ANNUAL WELLNESS VISIT, SUBSEQUENT: Primary | ICD-10-CM

## 2025-06-09 PROCEDURE — G0439 PPPS, SUBSEQ VISIT: HCPCS | Performed by: FAMILY MEDICINE

## 2025-06-09 NOTE — PROGRESS NOTES
Name: Eagle Nava      : 1960      MRN: [id number]  Encounter Provider: Ozzie Woods DO  Encounter Date: 2025   Encounter department: [facility]  :  Assessment & Plan  Medicare annual wellness visit, subsequent  Completed at this time.  [facility]         Depression Screening and Follow-up Plan: Patient was screened for depression during today's encounter. They screened negative with a PHQ-2 score of 0.        Preventive health issues were discussed with patient, and age appropriate screening tests were ordered as noted in patient's After Visit Summary. Personalized health advice and appropriate referrals for health education or preventive services given if needed, as noted in patient's After Visit Summary.    History of Present Illness     HPI   Patient Care Team:  Reji Woods DO as PCP - General (Family Medicine)  Calli Simms MD as PCP - PCP-Jennifer (RTE)    Review of Systems  Medical History Reviewed by provider this encounter:  Tobacco  Allergies  Meds  Problems  Med Hx  Surg Hx  Fam Hx       Annual Wellness Visit Questionnaire   Eagle is here for his Subsequent Wellness visit.     Health Risk Assessment:   Patient rates overall health as very good. Patient feels that their physical health rating is same. Patient is very satisfied with their life. Eyesight was rated as same. Hearing was rated as same. Patient feels that their emotional and mental health rating is same. Patients states they are never, rarely angry. Patient states they are sometimes unusually tired/fatigued. Pain experienced in the last 7 days has been some. Patient's pain rating has been 3/10. Patient states that he has experienced no weight loss or gain in last 6 months.     Depression Screening:   PHQ-2 Score: 0      Fall Risk Screening:   In the past year, patient has experienced: no history of falling in past year      Home  Safety:  Patient does not have trouble with stairs inside or outside of their home. Patient has working smoke alarms and has no working carbon monoxide detector. Home safety hazards include: none.     Nutrition:   Current diet is Regular.     Medications:   Patient is not currently taking any over-the-counter supplements. Patient is able to manage medications.     Activities of Daily Living (ADLs)/Instrumental Activities of Daily Living (IADLs):   Walk and transfer into and out of bed and chair?: Yes  Dress and groom yourself?: Yes    Bathe or shower yourself?: Yes    Feed yourself? Yes  Do your laundry/housekeeping?: Yes  Manage your money, pay your bills and track your expenses?: Yes  Make your own meals?: Yes    Do your own shopping?: Yes    Previous Hospitalizations:   Any hospitalizations or ED visits within the last 12 months?: No      Advance Care Planning:   Living will: Yes    Durable POA for healthcare: Yes    Advanced directive: Yes      Cognitive Screening:   Provider or family/friend/caregiver concerned regarding cognition?: No    Preventive Screenings      Cardiovascular Screening:    General: Screening Not Indicated, History Lipid Disorder, Risks and Benefits Discussed and Screening Current      Diabetes Screening:     General: Risks and Benefits Discussed and Screening Current      Colorectal Cancer Screening:     General: Risks and Benefits Discussed and Screening Current      Prostate Cancer Screening:    General: Risks and Benefits Discussed and Screening Current      Osteoporosis Screening:    General: Risks and Benefits Discussed and Screening Not Indicated      Abdominal Aortic Aneurysm (AAA) Screening:    Risk factors include: age between 65-76 yo and tobacco use        General: Risks and Benefits Discussed and Screening Not Indicated      Lung Cancer Screening:     General: Screening Not Indicated, Risks and Benefits Discussed and Screening Current      Hepatitis C Screening:    General:  Risks and Benefits Discussed and Screening Current    Screening, Brief Intervention, and Referral to Treatment (SBIRT)     Screening  Typical number of drinks in a day: 0  Typical number of drinks in a week: 1  Interpretation: Low risk drinking behavior.    AUDIT-C Screenin) How often did you have a drink containing alcohol in the past year? 2 to 4 times a month  2) How many drinks did you have on a typical day when you were drinking in the past year? 0  3) How often did you have 6 or more drinks on one occasion in the past year? never    AUDIT-C Score: 2  Interpretation: Score 0-3 (male): Negative screen for alcohol misuse    Single Item Drug Screening:  How often have you used an illegal drug (including marijuana) or a prescription medication for non-medical reasons in the past year? never    Single Item Drug Screen Score: 0  Interpretation: Negative screen for possible drug use disorder    Other Counseling Topics:   Regular weightbearing exercise.     Social Drivers of Health     Financial Resource Strain: Low Risk  (3/27/2025)    Received from [facility]    Financial Resource Strain     Do you have any trouble paying for your medications, or do you think you might in the future?: No   Food Insecurity: No Food Insecurity (2025)    Nursing - Inadequate Food Risk Classification     Worried About Running Out of Food in the Last Year: Never true     Ran Out of Food in the Last Year: Never true   Transportation Needs: No Transportation Needs (2025)    PRAPARE - Transportation     Lack of Transportation (Medical): No     Lack of Transportation (Non-Medical): No   Housing Stability: Low Risk  (2025)    Housing Stability Vital Sign     Unable to Pay for Housing in the Last Year: No     Number of Times Moved in the Last Year: 0     Homeless in the Last Year: No   Utilities: Not At Risk (2025)    [facility] Utilities     Threatened with loss of utilities: No     No results found.    Objective   /74 (BP  "Location: Right arm, Patient Position: Sitting, Cuff Size: Large)   Pulse 65   Temp 98.2 °F (36.8 °C) (Temporal)   Ht 5' 11\" (1.803 m)   Wt 104 kg (228 lb 3.2 oz)   SpO2 95%   BMI 31.83 kg/m²     Physical Exam    "

## 2025-06-09 NOTE — PATIENT INSTRUCTIONS
Medicare Preventive Visit Patient Instructions  Thank you for completing your Welcome to Medicare Visit or Medicare Annual Wellness Visit today. Your next wellness visit will be due in one year (6/10/2026).  The screening/preventive services that you may require over the next 5-10 years are detailed below. Some tests may not apply to you based off risk factors and/or age. Screening tests ordered at today's visit but not completed yet may show as past due. Also, please note that scanned in results may not display below.  Preventive Screenings:  Service Recommendations Previous Testing/Comments   Colorectal Cancer Screening  Colonoscopy    Fecal Occult Blood Test (FOBT)/Fecal Immunochemical Test (FIT)  Fecal DNA/Cologuard Test  Flexible Sigmoidoscopy Age: 45-75 years old   Colonoscopy: every 10 years (May be performed more frequently if at higher risk)  OR  FOBT/FIT: every 1 year  OR  Cologuard: every 3 years  OR  Sigmoidoscopy: every 5 years  Screening may be recommended earlier than age 45 if at higher risk for colorectal cancer. Also, an individualized decision between you and your healthcare provider will decide whether screening between the ages of 76-85 would be appropriate. Colonoscopy: Not on file  FOBT/FIT: Not on file  Cologuard: Not on file  Sigmoidoscopy: Not on file          Prostate Cancer Screening Individualized decision between patient and health care provider in men between ages of 55-69   Medicare will cover every 12 months beginning on the day after your 50th birthday PSA: No results in last 5 years           Hepatitis C Screening Once for adults born between 1945 and 1965  More frequently in patients at high risk for Hepatitis C Hep C Antibody: Not on file        Diabetes Screening 1-2 times per year if you're at risk for diabetes or have pre-diabetes Fasting glucose: No results in last 5 years (No results in last 5 years)  A1C: No results in last 5 years (No results in last 5 years)       Cholesterol Screening Once every 5 years if you don't have a lipid disorder. May order more often based on risk factors. Lipid panel: 02/27/2021  Screening Not Indicated  History Lipid Disorder      Other Preventive Screenings Covered by Medicare:  Abdominal Aortic Aneurysm (AAA) Screening: covered once if your at risk. You're considered to be at risk if you have a family history of AAA or a male between the age of 65-75 who smoking at least 100 cigarettes in your lifetime.  Lung Cancer Screening: covers low dose CT scan once per year if you meet all of the following conditions: (1) Age 55-77; (2) No signs or symptoms of lung cancer; (3) Current smoker or have quit smoking within the last 15 years; (4) You have a tobacco smoking history of at least 20 pack years (packs per day x number of years you smoked); (5) You get a written order from a healthcare provider.  Glaucoma Screening: covered annually if you're considered high risk: (1) You have diabetes OR (2) Family history of glaucoma OR (3)  aged 50 and older OR (4)  American aged 65 and older  Osteoporosis Screening: covered every 2 years if you meet one of the following conditions: (1) Have a vertebral abnormality; (2) On glucocorticoid therapy for more than 3 months; (3) Have primary hyperparathyroidism; (4) On osteoporosis medications and need to assess response to drug therapy.  HIV Screening: covered annually if you're between the age of 15-65. Also covered annually if you are younger than 15 and older than 65 with risk factors for HIV infection. For pregnant patients, it is covered up to 3 times per pregnancy.    Immunizations:  Immunization Recommendations   Influenza Vaccine Annual influenza vaccination during flu season is recommended for all persons aged >= 6 months who do not have contraindications   Pneumococcal Vaccine   * Pneumococcal conjugate vaccine = PCV13 (Prevnar 13), PCV15 (Vaxneuvance), PCV20 (Prevnar 20)  *  Pneumococcal polysaccharide vaccine = PPSV23 (Pneumovax) Adults 19-65 yo with certain risk factors or if 65+ yo  If never received any pneumonia vaccine: recommend Prevnar 20 (PCV20)  Give PCV20 if previously received 1 dose of PCV13 or PPSV23   Hepatitis B Vaccine 3 dose series if at intermediate or high risk (ex: diabetes, end stage renal disease, liver disease)   Respiratory syncytial virus (RSV) Vaccine - COVERED BY MEDICARE PART D  * RSVPreF3 (Arexvy) CDC recommends that adults 60 years of age and older may receive a single dose of RSV vaccine using shared clinical decision-making (SCDM)   Tetanus (Td) Vaccine - COST NOT COVERED BY MEDICARE PART B Following completion of primary series, a booster dose should be given every 10 years to maintain immunity against tetanus. Td may also be given as tetanus wound prophylaxis.   Tdap Vaccine - COST NOT COVERED BY MEDICARE PART B Recommended at least once for all adults. For pregnant patients, recommended with each pregnancy.   Shingles Vaccine (Shingrix) - COST NOT COVERED BY MEDICARE PART B  2 shot series recommended in those 19 years and older who have or will have weakened immune systems or those 50 years and older     Health Maintenance Due:      Topic Date Due   • Hepatitis C Screening  Never done   • HIV Screening  Never done   • Colorectal Cancer Screening  Never done     Immunizations Due:      Topic Date Due   • COVID-19 Vaccine (4 - 2024-25 season) 09/01/2024     Advance Directives   What are advance directives?  Advance directives are legal documents that state your wishes and plans for medical care. These plans are made ahead of time in case you lose your ability to make decisions for yourself. Advance directives can apply to any medical decision, such as the treatments you want, and if you want to donate organs.   What are the types of advance directives?  There are many types of advance directives, and each state has rules about how to use them. You may  choose a combination of any of the following:  Living will:  This is a written record of the treatment you want. You can also choose which treatments you do not want, which to limit, and which to stop at a certain time. This includes surgery, medicine, IV fluid, and tube feedings.   Durable power of  for healthcare (DPAHC):  This is a written record that states who you want to make healthcare choices for you when you are unable to make them for yourself. This person, called a proxy, is usually a family member or a friend. You may choose more than 1 proxy.  Do not resuscitate (DNR) order:  A DNR order is used in case your heart stops beating or you stop breathing. It is a request not to have certain forms of treatment, such as CPR. A DNR order may be included in other types of advance directives.  Medical directive:  This covers the care that you want if you are in a coma, near death, or unable to make decisions for yourself. You can list the treatments you want for each condition. Treatment may include pain medicine, surgery, blood transfusions, dialysis, IV or tube feedings, and a ventilator (breathing machine).  Values history:  This document has questions about your views, beliefs, and how you feel and think about life. This information can help others choose the care that you would choose.  Why are advance directives important?  An advance directive helps you control your care. Although spoken wishes may be used, it is better to have your wishes written down. Spoken wishes can be misunderstood, or not followed. Treatments may be given even if you do not want them. An advance directive may make it easier for your family to make difficult choices about your care.   Weight Management   Why it is important to manage your weight:  Being overweight increases your risk of health conditions such as heart disease, high blood pressure, type 2 diabetes, and certain types of cancer. It can also increase your risk  for osteoarthritis, sleep apnea, and other respiratory problems. Aim for a slow, steady weight loss. Even a small amount of weight loss can lower your risk of health problems.  How to lose weight safely:  A safe and healthy way to lose weight is to eat fewer calories and get regular exercise. You can lose up about 1 pound a week by decreasing the number of calories you eat by 500 calories each day.   Healthy meal plan for weight management:  A healthy meal plan includes a variety of foods, contains fewer calories, and helps you stay healthy. A healthy meal plan includes the following:  Eat whole-grain foods more often.  A healthy meal plan should contain fiber. Fiber is the part of grains, fruits, and vegetables that is not broken down by your body. Whole-grain foods are healthy and provide extra fiber in your diet. Some examples of whole-grain foods are whole-wheat breads and pastas, oatmeal, brown rice, and bulgur.  Eat a variety of vegetables every day.  Include dark, leafy greens such as spinach, kale, gabrielle greens, and mustard greens. Eat yellow and orange vegetables such as carrots, sweet potatoes, and winter squash.   Eat a variety of fruits every day.  Choose fresh or canned fruit (canned in its own juice or light syrup) instead of juice. Fruit juice has very little or no fiber.  Eat low-fat dairy foods.  Drink fat-free (skim) milk or 1% milk. Eat fat-free yogurt and low-fat cottage cheese. Try low-fat cheeses such as mozzarella and other reduced-fat cheeses.  Choose meat and other protein foods that are low in fat.  Choose beans or other legumes such as split peas or lentils. Choose fish, skinless poultry (chicken or turkey), or lean cuts of red meat (beef or pork). Before you cook meat or poultry, cut off any visible fat.   Use less fat and oil.  Try baking foods instead of frying them. Add less fat, such as margarine, sour cream, regular salad dressing and mayonnaise to foods. Eat fewer high-fat foods.  Some examples of high-fat foods include french fries, doughnuts, ice cream, and cakes.  Eat fewer sweets.  Limit foods and drinks that are high in sugar. This includes candy, cookies, regular soda, and sweetened drinks.  Exercise:  Exercise at least 30 minutes per day on most days of the week. Some examples of exercise include walking, biking, dancing, and swimming. You can also fit in more physical activity by taking the stairs instead of the elevator or parking farther away from stores. Ask your healthcare provider about the best exercise plan for you.    © Copyright [employer] 2018 Information is for End User's use only and may not be sold, redistributed or otherwise used for commercial purposes. All illustrations and images included in CareNotes® are the copyrighted property of [employer] or [employer]